# Patient Record
Sex: MALE | Race: OTHER | HISPANIC OR LATINO | Employment: FULL TIME | URBAN - METROPOLITAN AREA
[De-identification: names, ages, dates, MRNs, and addresses within clinical notes are randomized per-mention and may not be internally consistent; named-entity substitution may affect disease eponyms.]

---

## 2018-01-11 ENCOUNTER — ALLSCRIPTS OFFICE VISIT (OUTPATIENT)
Dept: OTHER | Facility: OTHER | Age: 28
End: 2018-01-11

## 2018-01-11 DIAGNOSIS — G47.9 SLEEP DISORDER: ICD-10-CM

## 2018-01-11 DIAGNOSIS — E66.01 MORBID (SEVERE) OBESITY DUE TO EXCESS CALORIES (HCC): ICD-10-CM

## 2018-01-24 VITALS
HEART RATE: 84 BPM | DIASTOLIC BLOOD PRESSURE: 96 MMHG | WEIGHT: 253.09 LBS | HEIGHT: 68 IN | TEMPERATURE: 98.5 F | SYSTOLIC BLOOD PRESSURE: 138 MMHG | BODY MASS INDEX: 38.36 KG/M2

## 2018-01-24 NOTE — PROGRESS NOTES
Assessment    1  Sleeping difficulties (780 50) (G47 9)   2  Decreased vision (369 9) (H54 7)   3  Acanthosis nigricans (701 2) (L83)   4  Morbid obesity due to excess calories (278 01) (E66 01)   5  Snoring (786 09) (R06 83)    Plan  Morbid obesity due to excess calories    · (1) COMPREHENSIVE METABOLIC PANEL; Status:Active; Requested UDO:[id number];    · (1) LIPID PANEL FASTING W DIRECT LDL REFLEX; Status:Active; Requested  DTF:[id number];   Sleeping difficulties    · (1) CBC/PLT/DIFF; Status:Active; Requested STEPHEN:[id number];    · (1) TSH WITH FT4 REFLEX; Status:Active; Requested YOU:[id number];   Snoring    · *1 - [address]  *patient has problems with loud snoring,  wakes up with feeling of choking,  Status: Hold For - Scheduling  Requested for:  [id number]  Care Summary provided  : Yes    Discussion/Summary  Discussion Summary:   Morbid obesity/snoring/sleep disturbances  [facility] for sleep apnea  He is advised about the need for weight loss which may help with breathing  Decreased vision  Is advised to follow up with an ophthalmologist given that he has not gone back to see them in several years  Acanthosis nigricans  Patient will be check for diabetes via CMP (serum glucose level)  Counseling Documentation With Imm: The patient was counseled regarding instructions for management, risk factor reductions, risks and benefits of treatment options, importance of compliance with treatment  Patient Education: Educational resources provided:   Medication SE Review and Pt Understands Tx: Possible side effects of new medications were reviewed with the patient/guardian today  The treatment plan was reviewed with the patient/guardian   The patient/guardian understands and agrees with the treatment plan      Chief Complaint  Chief Complaint Free Text Note Form: Patient presents to the clinic to establish care      History of Present Illness  HPI: As above  He reports he has not been getting care from any PCP  Denies any chronic medical conditions  However reports he has been having sleep disturbances; wakes up at night feeling like he is choking on himself, snores loudly, states he has been told that he may have sleep apnea but he is not sure wants to verify that with me today  Uses eyeglasses for decreased vision; has poor distant vision and can only see blurry when looking at letter in a book  He is not sure what his diagnosis is  Not on any medications  Currently morbidly obese with a BMI of 39   Hospital Based Practices Required Assessment:   Pain Assessment   the patient states they do not have pain  (on a scale of 0 to 10, the patient rates the pain at 0 )    Prefered Language is  ENGLISH  Primary Language is  ENGLISH  Education Completed: disease/condition, medications and treatment/procedure   Teaching Method: verbal   Person Taught: patient   Evaluation Of Learning: verbalized/demonstrated understanding      Review of Systems  Complete-Male:   Constitutional: No fever or chills, feels well, no tiredness, no recent weight gain or weight loss  Eyes: eyesight problems, but as noted in HPI, no eye pain, eyes not red and no purulent discharge from the eyes  ENT: nasal discharge and loud snoring, wakes up at night feeling like choking, denies nightmares,, but as noted in HPI, no earache, no nosebleeds and no hearing loss  Cardiovascular: No complaints of slow heart rate, no fast heart rate, no chest pain, no palpitations, no leg claudication, no lower extremity  Respiratory: shortness of breath during exertion, but as noted in HPI, no shortness of breath, no cough and no wheezing  Gastrointestinal: No complaints of abdominal pain, no constipation, no nausea or vomiting, no diarrhea or bloody stools  Musculoskeletal: No complaints of arthralgia, no myalgias, no joint swelling or stiffness, no limb pain or swelling     Integumentary: No complaints of skin rash or skin lesions, no itching, no skin wound, no dry skin  Neurological: No compliants of headache, no confusion, no convulsions, no numbness or tingling, no dizziness or fainting, no limb weakness, no difficulty walking  Psychiatric: sleep disturbances, but as noted in HPI, not suicidal, no anxiety and no depression  Hematologic/Lymphatic: No complaints of swollen glands, no swollen glands in the neck, does not bleed easily, no easy bruising  ROS Reviewed:   ROS reviewed  Active Problems    1  Decreased vision (369 9) (H54 7)   2  Sleeping difficulties (780 50) (G47 9)    Past Medical History  Active Problems And Past Medical History Reviewed: The active problems and past medical history were reviewed and updated today  Surgical History    1  History of Elective Circumcision  Surgical History Reviewed: The surgical history was reviewed and updated today  Family History  Mother    1  Family history of No significant medical problems  Father    2  Family history unknown (V49 89) (Z78 9)  Family History    3  No family history of mental disorder  Family History Reviewed: The family history was reviewed and updated today  Social History    · Current every day smoker (305 1) (F17 200)  Social History Reviewed: The social history was reviewed and updated today  The social history was reviewed and is unchanged  Current Meds   1  No Reported Medications Recorded    Allergies    1  No Known Drug Allergies    Vitals  Signs   Recorded: [id number] 03:21PM   Temperature: 98 5 F  Heart Rate: 84  Systolic: 472  Diastolic: 96  Height: 5 ft 7 5 in  Weight: 253 lb 1 38 oz  BMI Calculated: 39 05  BSA Calculated: 2 25    Physical Exam    Constitutional   General appearance: No acute distress, well appearing and well nourished  Eyes   Conjunctiva and lids: No erythema, swelling or discharge  Pupils and irises: Equal, round, reactive to light      Ears, Nose, Mouth, and Throat   Otoscopic examination: Tympanic membranes translucent with normal light reflex  Canals patent without erythema  Nasal mucosa, septum, and turbinates: Normal without edema or erythema  Oropharynx: Normal with no erythema, edema, exudate or lesions  Pulmonary   Respiratory effort: No increased work of breathing or signs of respiratory distress  Auscultation of lungs: Clear to auscultation  Cardiovascular   Auscultation of heart: Normal rate and rhythm, normal S1 and S2, no murmurs  Pedal pulses: 2+ bilaterally  Abdomen   Abdomen: Non-tender, no masses  Liver and spleen: No hepatomegaly or splenomegaly  Musculoskeletal   Gait and station: Normal     Inspection/palpation of digits and nails: Normal without clubbing or cyanosis  Inspection/palpation of joints, bones, and muscles: Normal     Range of motion: Normal     Stability: Normal     Muscle strength/tone: Normal     Skin   Skin and subcutaneous tissue: Abnormal   Dark discoloration around his neck  Acanthosis nigricans or other hyperpigmentation of melanin  Neurologic   Cranial nerves: Cranial nerves 2-12 intact   grossly intact  Psychiatric   Judgment and insight: Normal     Orientation to person, place and time: Normal     Recent and remote memory: Intact  Mood and affect: Normal        Results/Data  PHQ-2 Adult Depression Screening [id number] 03:24PM User, [employer]     Test Name Result Flag Reference   PHQ-2 Adult Depression Score 0     Over the last two weeks, how often have you been bothered by any of the following problems? Little interest or pleasure in doing things: Not at all - 0  Feeling down, depressed, or hopeless: Not at all - 0   PHQ-2 Adult Depression Screening Negative         Attending Note  Collaborating Physician Note: Collaborating Physician: I agree with the Advanced Practitioner note  Agree with Advanced Practitioner Note Except: Agree with referral for sleep study   If not sleep apnea will need further investigation into possible GI vs cardiac cause  Signatures   Electronically signed by : Missael Umanzor; Jan 11 2018  4:16PM EST                       (Author)    Electronically signed by :  FRAN Parham ; Jan 23 2018  2:56PM EST                       (Review)

## 2018-08-10 ENCOUNTER — OFFICE VISIT (OUTPATIENT)
Dept: INTERNAL MEDICINE CLINIC | Facility: CLINIC | Age: 28
End: 2018-08-10
Payer: COMMERCIAL

## 2018-08-10 VITALS
SYSTOLIC BLOOD PRESSURE: 96 MMHG | BODY MASS INDEX: 31.28 KG/M2 | HEIGHT: 69 IN | WEIGHT: 211.2 LBS | DIASTOLIC BLOOD PRESSURE: 64 MMHG | HEART RATE: 80 BPM | TEMPERATURE: 98 F

## 2018-08-10 DIAGNOSIS — R05.8 NON-PRODUCTIVE COUGH: Primary | ICD-10-CM

## 2018-08-10 PROCEDURE — 99213 OFFICE O/P EST LOW 20 MIN: CPT | Performed by: INTERNAL MEDICINE

## 2018-08-10 PROCEDURE — 3008F BODY MASS INDEX DOCD: CPT | Performed by: INTERNAL MEDICINE

## 2018-08-10 RX ORDER — PREDNISONE 10 MG/1
TABLET ORAL
Qty: 13 TABLET | Refills: 0 | Status: SHIPPED | OUTPATIENT
Start: 2018-08-10 | End: 2018-09-25

## 2018-08-10 NOTE — PROGRESS NOTES
ASSESSMENT/PLAN:  Diagnoses and all orders for this visit:    Non-productive cough  -     predniSONE 10 mg tablet; Take 30mg (three 10mg tablets) for first three days  For days 4-5, take 20mg (two 10mg tablets) daily  Plan:  Cough, nonproductive acute   Oral prednisone prescribed for five days (30mg for three days, 20mg for two days)   Patient advised he can take OTC cough suppressant for symptomatic relief and should stay hydrated  Smoking   Patient counseled on smoking cessation   Pt is not interested in quitting at this time or trying patches/gums  Follow-up:  If symptoms don't improve  Follow up for chronic medical concerns    CHIEF COMPLAINT: cough with dizziness    HISTORY OF PRESENT ILLNESS:  34 yo Pt with no significant past medical history with two weeks of coughing, nonproductive cough 3-4 times a day <1 minute  Yesterday and today patient has been experiencing lightheadedness with coughing (even after 2-3 coughs), though he has been eating and hydrating well throughout this week (approximately 3 liters)  Pt does state that last week he had 2 days of watery nonbloody, nonblack diarrhea but has not had any abnormal bowel movements in the last week  Pt does not take any medications  At last visit pt was asked to get baseline bloodwork and sleep study but has not done this yet  Pt has not had any sick contacts or recent respiratory illnesses, recently travelled to [place] this past weekend by car  Pt has been smoking pack per week for approximately 15 years  Pt would also like note for work that he was seen here today  Review of Systems   Constitutional: Negative for appetite change, chills, fatigue, fever and unexpected weight change  Respiratory: Positive for cough  Negative for choking, chest tightness, shortness of breath, wheezing and stridor  Cardiovascular: Negative for chest pain, palpitations and leg swelling  Gastrointestinal: Positive for diarrhea   Negative for abdominal pain  Genitourinary: Negative for difficulty urinating and hematuria  Musculoskeletal: Negative for myalgias  Skin: Negative for color change and pallor  Neurological: Positive for dizziness, weakness and light-headedness  Psychiatric/Behavioral: Negative for confusion  OBJECTIVE:  Vitals:    08/10/18 1124   BP: 96/64   BP Location: Left arm   Patient Position: Sitting   Cuff Size: Adult   Pulse: 80   Temp: 98 °F (36 7 °C)   TempSrc: Oral   Weight: 95 8 kg (211 lb 3 2 oz)   Height: 5' 8 5" (1 74 m)       Physical Exam   Constitutional: He is oriented to person, place, and time  He appears well-developed and well-nourished  No distress  HENT:   Head: Normocephalic and atraumatic  Mouth/Throat: Oropharynx is clear and moist  No oropharyngeal exudate  Eyes: Conjunctivae are normal  Right eye exhibits no discharge  Left eye exhibits no discharge  Cardiovascular: Normal rate, regular rhythm and normal heart sounds  Exam reveals no gallop and no friction rub  No murmur heard  Pulmonary/Chest: Effort normal and breath sounds normal  No stridor  No respiratory distress  He has no wheezes  He has no rales  Abdominal: Soft  Bowel sounds are normal  He exhibits no distension  There is no tenderness  There is no guarding  Musculoskeletal: He exhibits no edema or deformity  Neurological: He is alert and oriented to person, place, and time  Skin: Skin is warm and dry  He is not diaphoretic  Psychiatric: He has a normal mood and affect  His behavior is normal    Vitals reviewed  No current outpatient prescriptions on file  No past medical history on file  No past surgical history on file  Social History     Social History    Marital status: Single     Spouse name: N/A    Number of children: N/A    Years of education: N/A     Occupational History    Not on file       Social History Main Topics    Smoking status: Not on file    Smokeless tobacco: Not on file    Alcohol use Not on file    Drug use: Unknown    Sexual activity: Not on file     Other Topics Concern    Not on file     Social History Narrative    No narrative on file     Family History   Problem Relation Age of Onset    Diabetes Mother        Debby Steward, Yuri Galaviz 15 Internal Medicine PGY-1    [facility]  [address] [facility] [facility]  [phone]

## 2018-09-25 ENCOUNTER — APPOINTMENT (INPATIENT)
Dept: RADIOLOGY | Facility: HOSPITAL | Age: 28
DRG: 958 | End: 2018-09-25
Payer: COMMERCIAL

## 2018-09-25 ENCOUNTER — ANESTHESIA EVENT (OUTPATIENT)
Dept: PERIOP | Facility: HOSPITAL | Age: 28
End: 2018-09-25

## 2018-09-25 ENCOUNTER — APPOINTMENT (EMERGENCY)
Dept: CT IMAGING | Facility: HOSPITAL | Age: 28
End: 2018-09-25
Payer: COMMERCIAL

## 2018-09-25 ENCOUNTER — HOSPITAL ENCOUNTER (INPATIENT)
Facility: HOSPITAL | Age: 28
LOS: 6 days | Discharge: NURSING, REHAB OR LONG-TERM CARE | DRG: 958 | End: 2018-10-01
Attending: SURGERY | Admitting: SURGERY
Payer: COMMERCIAL

## 2018-09-25 ENCOUNTER — APPOINTMENT (EMERGENCY)
Dept: RADIOLOGY | Facility: HOSPITAL | Age: 28
End: 2018-09-25
Payer: COMMERCIAL

## 2018-09-25 ENCOUNTER — HOSPITAL ENCOUNTER (EMERGENCY)
Facility: HOSPITAL | Age: 28
End: 2018-09-25
Attending: EMERGENCY MEDICINE | Admitting: EMERGENCY MEDICINE
Payer: COMMERCIAL

## 2018-09-25 ENCOUNTER — APPOINTMENT (EMERGENCY)
Dept: RADIOLOGY | Facility: HOSPITAL | Age: 28
DRG: 958 | End: 2018-09-25
Payer: COMMERCIAL

## 2018-09-25 VITALS
HEART RATE: 83 BPM | HEIGHT: 68 IN | OXYGEN SATURATION: 100 % | RESPIRATION RATE: 21 BRPM | DIASTOLIC BLOOD PRESSURE: 71 MMHG | WEIGHT: 210 LBS | SYSTOLIC BLOOD PRESSURE: 146 MMHG | BODY MASS INDEX: 31.83 KG/M2 | TEMPERATURE: 98.1 F

## 2018-09-25 DIAGNOSIS — S36.029A: ICD-10-CM

## 2018-09-25 DIAGNOSIS — S32.811A MULTIPLE CLOSED FRACTURES OF PELVIS WITH UNSTABLE DISRUPTION OF PELVIC RING, INITIAL ENCOUNTER (HCC): ICD-10-CM
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
## 2018-09-25 DIAGNOSIS — S36.00XA INJURY OF SPLEEN, INITIAL ENCOUNTER: Primary | ICD-10-CM

## 2018-09-25 DIAGNOSIS — S32.10XA SACRAL FRACTURE, CLOSED (HCC): ICD-10-CM

## 2018-09-25 DIAGNOSIS — N50.1 PELVIC HEMATOMA, MALE: ICD-10-CM

## 2018-09-25 DIAGNOSIS — S32.401A CLOSED NONDISPLACED FRACTURE OF RIGHT ACETABULUM, UNSPECIFIED PORTION OF ACETABULUM, INITIAL ENCOUNTER (HCC): Primary | ICD-10-CM

## 2018-09-25 DIAGNOSIS — S32.401A RIGHT ACETABULAR FRACTURE (HCC): ICD-10-CM

## 2018-09-25 DIAGNOSIS — S32.009A LUMBAR TRANSVERSE PROCESS FRACTURE, CLOSED, INITIAL ENCOUNTER (HCC): ICD-10-CM

## 2018-09-25 PROBLEM — S36.039A SPLENIC LACERATION: Status: ACTIVE | Noted: 2018-09-25

## 2018-09-25 LAB
ANION GAP BLD CALC-SCNC: 23 MMOL/L (ref 4–13)
ANION GAP SERPL CALCULATED.3IONS-SCNC: 6 MMOL/L (ref 4–13)
BASOPHILS # BLD AUTO: 0.06 THOUSANDS/ΜL (ref 0–0.1)
BASOPHILS NFR BLD AUTO: 0 % (ref 0–1)
BUN BLD-MCNC: 11 MG/DL (ref 5–25)
BUN SERPL-MCNC: 10 MG/DL (ref 5–25)
CA-I BLD-SCNC: 1.06 MMOL/L (ref 1.12–1.32)
CALCIUM SERPL-MCNC: 8.1 MG/DL (ref 8.3–10.1)
CHLORIDE BLD-SCNC: 103 MMOL/L (ref 100–108)
CHLORIDE SERPL-SCNC: 106 MMOL/L (ref 100–108)
CO2 SERPL-SCNC: 26 MMOL/L (ref 21–32)
CREAT BLD-MCNC: 1.1 MG/DL (ref 0.6–1.3)
CREAT SERPL-MCNC: 1 MG/DL (ref 0.6–1.3)
EOSINOPHIL # BLD AUTO: 0.08 THOUSAND/ΜL (ref 0–0.61)
EOSINOPHIL NFR BLD AUTO: 0 % (ref 0–6)
ERYTHROCYTE [DISTWIDTH] IN BLOOD BY AUTOMATED COUNT: 13.2 % (ref 11.6–15.1)
GFR SERPL CREATININE-BSD FRML MDRD: 101 ML/MIN/1.73SQ M
GFR SERPL CREATININE-BSD FRML MDRD: 90 ML/MIN/1.73SQ M
GLUCOSE SERPL-MCNC: 135 MG/DL (ref 65–140)
GLUCOSE SERPL-MCNC: 194 MG/DL (ref 65–140)
HCT VFR BLD AUTO: 42.5 % (ref 36.5–49.3)
HCT VFR BLD AUTO: 42.6 % (ref 36.5–49.3)
HCT VFR BLD AUTO: 42.9 % (ref 36.5–49.3)
HCT VFR BLD CALC: 47 % (ref 36.5–49.3)
HGB BLD-MCNC: 14 G/DL (ref 12–17)
HGB BLD-MCNC: 14 G/DL (ref 12–17)
HGB BLD-MCNC: 14.1 G/DL (ref 12–17)
HGB BLDA-MCNC: 16 G/DL (ref 12–17)
IMM GRANULOCYTES # BLD AUTO: 0.2 THOUSAND/UL (ref 0–0.2)
IMM GRANULOCYTES NFR BLD AUTO: 1 % (ref 0–2)
LYMPHOCYTES # BLD AUTO: 2.33 THOUSANDS/ΜL (ref 0.6–4.47)
LYMPHOCYTES NFR BLD AUTO: 9 % (ref 14–44)
MCH RBC QN AUTO: 29.4 PG (ref 26.8–34.3)
MCHC RBC AUTO-ENTMCNC: 32.9 G/DL (ref 31.4–37.4)
MCV RBC AUTO: 89 FL (ref 82–98)
MONOCYTES # BLD AUTO: 1.79 THOUSAND/ΜL (ref 0.17–1.22)
MONOCYTES NFR BLD AUTO: 7 % (ref 4–12)
NEUTROPHILS # BLD AUTO: 20.39 THOUSANDS/ΜL (ref 1.85–7.62)
NEUTS SEG NFR BLD AUTO: 83 % (ref 43–75)
NRBC BLD AUTO-RTO: 0 /100 WBCS
PCO2 BLD: 18 MMOL/L (ref 21–32)
PLATELET # BLD AUTO: 368 THOUSANDS/UL (ref 149–390)
PMV BLD AUTO: 10.7 FL (ref 8.9–12.7)
POTASSIUM BLD-SCNC: 3.3 MMOL/L (ref 3.5–5.3)
POTASSIUM SERPL-SCNC: 4.5 MMOL/L (ref 3.5–5.3)
RBC # BLD AUTO: 4.76 MILLION/UL (ref 3.88–5.62)
SODIUM BLD-SCNC: 140 MMOL/L (ref 136–145)
SODIUM SERPL-SCNC: 138 MMOL/L (ref 136–145)
SPECIMEN SOURCE: ABNORMAL
WBC # BLD AUTO: 24.85 THOUSAND/UL (ref 4.31–10.16)

## 2018-09-25 PROCEDURE — 99285 EMERGENCY DEPT VISIT HI MDM: CPT

## 2018-09-25 PROCEDURE — 96374 THER/PROPH/DIAG INJ IV PUSH: CPT

## 2018-09-25 PROCEDURE — 72170 X-RAY EXAM OF PELVIS: CPT

## 2018-09-25 PROCEDURE — 96360 HYDRATION IV INFUSION INIT: CPT

## 2018-09-25 PROCEDURE — 73080 X-RAY EXAM OF ELBOW: CPT

## 2018-09-25 PROCEDURE — 73560 X-RAY EXAM OF KNEE 1 OR 2: CPT

## 2018-09-25 PROCEDURE — 70450 CT HEAD/BRAIN W/O DYE: CPT

## 2018-09-25 PROCEDURE — 74177 CT ABD & PELVIS W/CONTRAST: CPT

## 2018-09-25 PROCEDURE — 72125 CT NECK SPINE W/O DYE: CPT

## 2018-09-25 PROCEDURE — 36415 COLL VENOUS BLD VENIPUNCTURE: CPT | Performed by: SURGERY

## 2018-09-25 PROCEDURE — 85014 HEMATOCRIT: CPT | Performed by: SURGERY

## 2018-09-25 PROCEDURE — 85014 HEMATOCRIT: CPT

## 2018-09-25 PROCEDURE — 72190 X-RAY EXAM OF PELVIS: CPT

## 2018-09-25 PROCEDURE — 90471 IMMUNIZATION ADMIN: CPT

## 2018-09-25 PROCEDURE — 90715 TDAP VACCINE 7 YRS/> IM: CPT | Performed by: SURGERY

## 2018-09-25 PROCEDURE — 80047 BASIC METABLC PNL IONIZED CA: CPT

## 2018-09-25 PROCEDURE — 80048 BASIC METABOLIC PNL TOTAL CA: CPT | Performed by: EMERGENCY MEDICINE

## 2018-09-25 PROCEDURE — 99223 1ST HOSP IP/OBS HIGH 75: CPT | Performed by: SURGERY

## 2018-09-25 PROCEDURE — 85018 HEMOGLOBIN: CPT | Performed by: SURGERY

## 2018-09-25 PROCEDURE — 96376 TX/PRO/DX INJ SAME DRUG ADON: CPT

## 2018-09-25 PROCEDURE — 36415 COLL VENOUS BLD VENIPUNCTURE: CPT | Performed by: EMERGENCY MEDICINE

## 2018-09-25 PROCEDURE — 85025 COMPLETE CBC W/AUTO DIFF WBC: CPT | Performed by: EMERGENCY MEDICINE

## 2018-09-25 PROCEDURE — 71260 CT THORAX DX C+: CPT

## 2018-09-25 RX ORDER — OXYCODONE HYDROCHLORIDE 5 MG/1
5 TABLET ORAL EVERY 4 HOURS PRN
Status: DISCONTINUED | OUTPATIENT
Start: 2018-09-25 | End: 2018-10-01 | Stop reason: HOSPADM

## 2018-09-25 RX ORDER — CHLORHEXIDINE GLUCONATE 0.12 MG/ML
15 RINSE ORAL EVERY 12 HOURS SCHEDULED
Status: DISCONTINUED | OUTPATIENT
Start: 2018-09-25 | End: 2018-09-26

## 2018-09-25 RX ORDER — ACETAMINOPHEN 325 MG/1
650 TABLET ORAL EVERY 6 HOURS PRN
Status: DISCONTINUED | OUTPATIENT
Start: 2018-09-25 | End: 2018-09-27

## 2018-09-25 RX ORDER — MORPHINE SULFATE 4 MG/ML
4 INJECTION, SOLUTION INTRAMUSCULAR; INTRAVENOUS
Status: COMPLETED | OUTPATIENT
Start: 2018-09-25 | End: 2018-09-25

## 2018-09-25 RX ORDER — OXYCODONE HYDROCHLORIDE 10 MG/1
10 TABLET ORAL EVERY 4 HOURS PRN
Status: DISCONTINUED | OUTPATIENT
Start: 2018-09-25 | End: 2018-10-01 | Stop reason: HOSPADM

## 2018-09-25 RX ORDER — NICOTINE 21 MG/24HR
1 PATCH, TRANSDERMAL 24 HOURS TRANSDERMAL DAILY
Status: DISCONTINUED | OUTPATIENT
Start: 2018-09-26 | End: 2018-10-01 | Stop reason: HOSPADM

## 2018-09-25 RX ORDER — SODIUM CHLORIDE, SODIUM LACTATE, POTASSIUM CHLORIDE, CALCIUM CHLORIDE 600; 310; 30; 20 MG/100ML; MG/100ML; MG/100ML; MG/100ML
75 INJECTION, SOLUTION INTRAVENOUS CONTINUOUS
Status: DISCONTINUED | OUTPATIENT
Start: 2018-09-25 | End: 2018-09-28

## 2018-09-25 RX ADMIN — TETANUS TOXOID, REDUCED DIPHTHERIA TOXOID AND ACELLULAR PERTUSSIS VACCINE, ADSORBED 0.5 ML: 5; 2.5; 8; 8; 2.5 SUSPENSION INTRAMUSCULAR at 18:49

## 2018-09-25 RX ADMIN — MORPHINE SULFATE 4 MG: 4 INJECTION INTRAVENOUS at 15:37

## 2018-09-25 RX ADMIN — SODIUM CHLORIDE 1000 ML: 0.9 INJECTION, SOLUTION INTRAVENOUS at 15:04

## 2018-09-25 RX ADMIN — MORPHINE SULFATE 4 MG: 4 INJECTION INTRAVENOUS at 13:19

## 2018-09-25 RX ADMIN — IODIXANOL 100 ML: 320 INJECTION, SOLUTION INTRAVASCULAR at 15:31

## 2018-09-25 RX ADMIN — MORPHINE SULFATE 4 MG: 4 INJECTION INTRAVENOUS at 13:55

## 2018-09-25 RX ADMIN — IOHEXOL 100 ML: 350 INJECTION, SOLUTION INTRAVENOUS at 14:22

## 2018-09-25 RX ADMIN — SODIUM CHLORIDE, SODIUM LACTATE, POTASSIUM CHLORIDE, AND CALCIUM CHLORIDE 125 ML/HR: .6; .31; .03; .02 INJECTION, SOLUTION INTRAVENOUS at 19:18

## 2018-09-25 RX ADMIN — OXYCODONE HYDROCHLORIDE 10 MG: 10 TABLET ORAL at 19:25

## 2018-09-25 RX ADMIN — CHLORHEXIDINE GLUCONATE 15 ML: 1.2 RINSE ORAL at 21:56

## 2018-09-25 NOTE — ED NOTES
SO in room, updated with current plan of care and transport time        Arjun Juárez RN  09/25/18 3458
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
## 2018-09-25 NOTE — ED NOTES
Pt placed on cardiac monitor  Vital signs cycling every 30 minutes       Sachin Angela RN  09/25/18 6000
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
## 2018-09-25 NOTE — ED NOTES
Pt complaining of severe pain  MD notified   Awaiting additional orders     Wil Rosales RN  09/25/18 0892
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
## 2018-09-25 NOTE — ED NOTES
Pt and visitor aware of ordered urinalysis, pt states he is unable to void at this time, acknowledged understanding that sample is needed  Provided urinal at bedside       Stephen Serrano RN  09/25/18 8231

## 2018-09-26 ENCOUNTER — ANESTHESIA (OUTPATIENT)
Dept: PERIOP | Facility: HOSPITAL | Age: 28
End: 2018-09-26

## 2018-09-26 ENCOUNTER — APPOINTMENT (INPATIENT)
Dept: RADIOLOGY | Facility: HOSPITAL | Age: 28
DRG: 958 | End: 2018-09-26
Payer: COMMERCIAL

## 2018-09-26 PROBLEM — D62 ACUTE BLOOD LOSS ANEMIA: Status: ACTIVE | Noted: 2018-09-26

## 2018-09-26 LAB
ABO GROUP BLD: NORMAL
ANION GAP SERPL CALCULATED.3IONS-SCNC: 7 MMOL/L (ref 4–13)
APTT PPP: 35 SECONDS (ref 24–36)
BASOPHILS # BLD AUTO: 0.02 THOUSANDS/ΜL (ref 0–0.1)
BASOPHILS NFR BLD AUTO: 0 % (ref 0–1)
BLD GP AB SCN SERPL QL: NEGATIVE
BUN SERPL-MCNC: 10 MG/DL (ref 5–25)
CALCIUM SERPL-MCNC: 7.6 MG/DL (ref 8.3–10.1)
CHLORIDE SERPL-SCNC: 106 MMOL/L (ref 100–108)
CO2 SERPL-SCNC: 25 MMOL/L (ref 21–32)
CREAT SERPL-MCNC: 0.88 MG/DL (ref 0.6–1.3)
EOSINOPHIL # BLD AUTO: 0.01 THOUSAND/ΜL (ref 0–0.61)
EOSINOPHIL NFR BLD AUTO: 0 % (ref 0–6)
ERYTHROCYTE [DISTWIDTH] IN BLOOD BY AUTOMATED COUNT: 13.3 % (ref 11.6–15.1)
GFR SERPL CREATININE-BSD FRML MDRD: 116 ML/MIN/1.73SQ M
GLUCOSE SERPL-MCNC: 102 MG/DL (ref 65–140)
GLUCOSE SERPL-MCNC: 113 MG/DL (ref 65–140)
HCT VFR BLD AUTO: 34.3 % (ref 36.5–49.3)
HCT VFR BLD AUTO: 34.4 % (ref 36.5–49.3)
HCT VFR BLD AUTO: 35.5 % (ref 36.5–49.3)
HCT VFR BLD AUTO: 39.5 % (ref 36.5–49.3)
HGB BLD-MCNC: 11.1 G/DL (ref 12–17)
HGB BLD-MCNC: 11.3 G/DL (ref 12–17)
HGB BLD-MCNC: 11.6 G/DL (ref 12–17)
HGB BLD-MCNC: 13.1 G/DL (ref 12–17)
IMM GRANULOCYTES # BLD AUTO: 0.02 THOUSAND/UL (ref 0–0.2)
IMM GRANULOCYTES NFR BLD AUTO: 0 % (ref 0–2)
INR PPP: 1.4 (ref 0.86–1.17)
LACTATE SERPL-SCNC: 1.3 MMOL/L (ref 0.5–2)
LYMPHOCYTES # BLD AUTO: 1.91 THOUSANDS/ΜL (ref 0.6–4.47)
LYMPHOCYTES NFR BLD AUTO: 31 % (ref 14–44)
MCH RBC QN AUTO: 29.4 PG (ref 26.8–34.3)
MCHC RBC AUTO-ENTMCNC: 32.7 G/DL (ref 31.4–37.4)
MCV RBC AUTO: 90 FL (ref 82–98)
MONOCYTES # BLD AUTO: 0.73 THOUSAND/ΜL (ref 0.17–1.22)
MONOCYTES NFR BLD AUTO: 12 % (ref 4–12)
NEUTROPHILS # BLD AUTO: 3.55 THOUSANDS/ΜL (ref 1.85–7.62)
NEUTS SEG NFR BLD AUTO: 57 % (ref 43–75)
NRBC BLD AUTO-RTO: 0 /100 WBCS
PLATELET # BLD AUTO: 271 THOUSANDS/UL (ref 149–390)
PMV BLD AUTO: 10.5 FL (ref 8.9–12.7)
POTASSIUM SERPL-SCNC: 4 MMOL/L (ref 3.5–5.3)
PROTHROMBIN TIME: 17.3 SECONDS (ref 11.8–14.2)
RBC # BLD AUTO: 3.95 MILLION/UL (ref 3.88–5.62)
RH BLD: POSITIVE
SODIUM SERPL-SCNC: 138 MMOL/L (ref 136–145)
SPECIMEN EXPIRATION DATE: NORMAL
WBC # BLD AUTO: 6.24 THOUSAND/UL (ref 4.31–10.16)

## 2018-09-26 PROCEDURE — 82948 REAGENT STRIP/BLOOD GLUCOSE: CPT

## 2018-09-26 PROCEDURE — 86900 BLOOD TYPING SEROLOGIC ABO: CPT | Performed by: STUDENT IN AN ORGANIZED HEALTH CARE EDUCATION/TRAINING PROGRAM

## 2018-09-26 PROCEDURE — 71045 X-RAY EXAM CHEST 1 VIEW: CPT

## 2018-09-26 PROCEDURE — 99232 SBSQ HOSP IP/OBS MODERATE 35: CPT | Performed by: SURGERY

## 2018-09-26 PROCEDURE — 86901 BLOOD TYPING SEROLOGIC RH(D): CPT | Performed by: STUDENT IN AN ORGANIZED HEALTH CARE EDUCATION/TRAINING PROGRAM

## 2018-09-26 PROCEDURE — 86920 COMPATIBILITY TEST SPIN: CPT

## 2018-09-26 PROCEDURE — 80048 BASIC METABOLIC PNL TOTAL CA: CPT | Performed by: STUDENT IN AN ORGANIZED HEALTH CARE EDUCATION/TRAINING PROGRAM

## 2018-09-26 PROCEDURE — 85610 PROTHROMBIN TIME: CPT | Performed by: STUDENT IN AN ORGANIZED HEALTH CARE EDUCATION/TRAINING PROGRAM

## 2018-09-26 PROCEDURE — 85025 COMPLETE CBC W/AUTO DIFF WBC: CPT | Performed by: STUDENT IN AN ORGANIZED HEALTH CARE EDUCATION/TRAINING PROGRAM

## 2018-09-26 PROCEDURE — 85014 HEMATOCRIT: CPT | Performed by: PHYSICIAN ASSISTANT

## 2018-09-26 PROCEDURE — 83605 ASSAY OF LACTIC ACID: CPT | Performed by: STUDENT IN AN ORGANIZED HEALTH CARE EDUCATION/TRAINING PROGRAM

## 2018-09-26 PROCEDURE — 85730 THROMBOPLASTIN TIME PARTIAL: CPT | Performed by: STUDENT IN AN ORGANIZED HEALTH CARE EDUCATION/TRAINING PROGRAM

## 2018-09-26 PROCEDURE — 85018 HEMOGLOBIN: CPT | Performed by: PHYSICIAN ASSISTANT

## 2018-09-26 PROCEDURE — 85014 HEMATOCRIT: CPT | Performed by: EMERGENCY MEDICINE

## 2018-09-26 PROCEDURE — 85014 HEMATOCRIT: CPT | Performed by: SURGERY

## 2018-09-26 PROCEDURE — 85018 HEMOGLOBIN: CPT | Performed by: EMERGENCY MEDICINE

## 2018-09-26 PROCEDURE — 85018 HEMOGLOBIN: CPT | Performed by: SURGERY

## 2018-09-26 PROCEDURE — 86850 RBC ANTIBODY SCREEN: CPT | Performed by: STUDENT IN AN ORGANIZED HEALTH CARE EDUCATION/TRAINING PROGRAM

## 2018-09-26 RX ORDER — DOCUSATE SODIUM 100 MG/1
100 CAPSULE, LIQUID FILLED ORAL 2 TIMES DAILY
Status: DISCONTINUED | OUTPATIENT
Start: 2018-09-26 | End: 2018-10-01 | Stop reason: HOSPADM

## 2018-09-26 RX ORDER — SENNOSIDES 8.6 MG
2 TABLET ORAL
Status: DISCONTINUED | OUTPATIENT
Start: 2018-09-26 | End: 2018-10-01 | Stop reason: HOSPADM

## 2018-09-26 RX ADMIN — SODIUM CHLORIDE, SODIUM LACTATE, POTASSIUM CHLORIDE, AND CALCIUM CHLORIDE 125 ML/HR: .6; .31; .03; .02 INJECTION, SOLUTION INTRAVENOUS at 12:48

## 2018-09-26 RX ADMIN — OXYCODONE HYDROCHLORIDE 10 MG: 10 TABLET ORAL at 17:47

## 2018-09-26 RX ADMIN — ACETAMINOPHEN 650 MG: 325 TABLET, FILM COATED ORAL at 01:02

## 2018-09-26 RX ADMIN — HYDROMORPHONE HYDROCHLORIDE 0.5 MG: 1 INJECTION, SOLUTION INTRAMUSCULAR; INTRAVENOUS; SUBCUTANEOUS at 17:18

## 2018-09-26 RX ADMIN — OXYCODONE HYDROCHLORIDE 10 MG: 10 TABLET ORAL at 13:07

## 2018-09-26 RX ADMIN — OXYCODONE HYDROCHLORIDE 10 MG: 10 TABLET ORAL at 00:05

## 2018-09-26 RX ADMIN — DOCUSATE SODIUM 100 MG: 100 CAPSULE, LIQUID FILLED ORAL at 17:27

## 2018-09-26 RX ADMIN — OXYCODONE HYDROCHLORIDE 10 MG: 10 TABLET ORAL at 21:52

## 2018-09-26 RX ADMIN — SENNOSIDES 17.2 MG: 8.6 TABLET, FILM COATED ORAL at 21:52

## 2018-09-26 RX ADMIN — HYDROMORPHONE HYDROCHLORIDE 0.5 MG: 1 INJECTION, SOLUTION INTRAMUSCULAR; INTRAVENOUS; SUBCUTANEOUS at 11:52

## 2018-09-26 RX ADMIN — CHLORHEXIDINE GLUCONATE 15 ML: 1.2 RINSE ORAL at 09:39

## 2018-09-26 RX ADMIN — SODIUM CHLORIDE 1000 ML: 0.9 INJECTION, SOLUTION INTRAVENOUS at 04:24
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
## 2018-09-26 NOTE — SOCIAL WORK
CM met pt at bedside and tonia aware of CM role at dc  Pt denies having a LW or POA> Pt reported that his primary contact is his HARINDER Mark Poet- [phone]  Pt lives with HARINDER Jarrett in a 1st floor apt with 7-8 YASMINE  Pt was IPTA with all ADL's, drive and employed  Pt has no DME  Pt denies hx with HHC, STR, alc, drug and IP psych tx  Pt has no PCP, [employer] card given  Preferred pharmacy is [facility], [address]  Pt reported that he has car insurance through [employer] with [employer] and medical insurance through [employer]  Pt has transportation when dc  Liba, financial liaison, informed of pt's car and medical insurance  CM reviewed d/c planning process including the following: identifying help at home, patient preference for d/c planning needs, Discharge Lounge, [facility] Meds to Bed program, availability of treatment team to discuss questions or concerns patient and/or family may have regarding understanding medications and recognizing signs and symptoms once discharged  CM also encouraged patient to follow up with all recommended appointments after discharge  Patient advised of importance for patient and family to participate in managing patients medical well being
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
## 2018-09-26 NOTE — PROGRESS NOTES
T-pod loosened at bedside  Will plan to remove later this afternoon if hemoglobin is stable  Patient is not going to the OR with orthopedics until tomorrow  Type and cross for 4 units PRBCs ordered
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
## 2018-09-27 ENCOUNTER — APPOINTMENT (INPATIENT)
Dept: RADIOLOGY | Facility: HOSPITAL | Age: 28
DRG: 958 | End: 2018-09-27
Payer: COMMERCIAL

## 2018-09-27 ENCOUNTER — ANESTHESIA (INPATIENT)
Dept: PERIOP | Facility: HOSPITAL | Age: 28
DRG: 958 | End: 2018-09-27
Payer: COMMERCIAL

## 2018-09-27 ENCOUNTER — ANESTHESIA EVENT (INPATIENT)
Dept: PERIOP | Facility: HOSPITAL | Age: 28
DRG: 958 | End: 2018-09-27
Payer: COMMERCIAL

## 2018-09-27 PROBLEM — M79.672 LEFT FOOT PAIN: Status: ACTIVE | Noted: 2018-09-27

## 2018-09-27 LAB
ANION GAP SERPL CALCULATED.3IONS-SCNC: 7 MMOL/L (ref 4–13)
ATRIAL RATE: 101 BPM
BASOPHILS # BLD AUTO: 0.03 THOUSANDS/ΜL (ref 0–0.1)
BASOPHILS NFR BLD AUTO: 1 % (ref 0–1)
BUN SERPL-MCNC: 8 MG/DL (ref 5–25)
CALCIUM SERPL-MCNC: 8.1 MG/DL (ref 8.3–10.1)
CHLORIDE SERPL-SCNC: 100 MMOL/L (ref 100–108)
CO2 SERPL-SCNC: 27 MMOL/L (ref 21–32)
CREAT SERPL-MCNC: 0.79 MG/DL (ref 0.6–1.3)
EOSINOPHIL # BLD AUTO: 0.33 THOUSAND/ΜL (ref 0–0.61)
EOSINOPHIL NFR BLD AUTO: 5 % (ref 0–6)
ERYTHROCYTE [DISTWIDTH] IN BLOOD BY AUTOMATED COUNT: 13.2 % (ref 11.6–15.1)
GFR SERPL CREATININE-BSD FRML MDRD: 121 ML/MIN/1.73SQ M
GLUCOSE SERPL-MCNC: 99 MG/DL (ref 65–140)
HCT VFR BLD AUTO: 33.1 % (ref 36.5–49.3)
HCT VFR BLD AUTO: 33.3 % (ref 36.5–49.3)
HGB BLD-MCNC: 10.8 G/DL (ref 12–17)
HGB BLD-MCNC: 10.8 G/DL (ref 12–17)
IMM GRANULOCYTES # BLD AUTO: 0.01 THOUSAND/UL (ref 0–0.2)
IMM GRANULOCYTES NFR BLD AUTO: 0 % (ref 0–2)
LYMPHOCYTES # BLD AUTO: 2.13 THOUSANDS/ΜL (ref 0.6–4.47)
LYMPHOCYTES NFR BLD AUTO: 34 % (ref 14–44)
MAGNESIUM SERPL-MCNC: 1.8 MG/DL (ref 1.6–2.6)
MCH RBC QN AUTO: 29.1 PG (ref 26.8–34.3)
MCHC RBC AUTO-ENTMCNC: 32.6 G/DL (ref 31.4–37.4)
MCV RBC AUTO: 89 FL (ref 82–98)
MONOCYTES # BLD AUTO: 0.83 THOUSAND/ΜL (ref 0.17–1.22)
MONOCYTES NFR BLD AUTO: 13 % (ref 4–12)
NEUTROPHILS # BLD AUTO: 2.97 THOUSANDS/ΜL (ref 1.85–7.62)
NEUTS SEG NFR BLD AUTO: 47 % (ref 43–75)
NRBC BLD AUTO-RTO: 0 /100 WBCS
P AXIS: 51 DEGREES
PHOSPHATE SERPL-MCNC: 2.8 MG/DL (ref 2.7–4.5)
PLATELET # BLD AUTO: 222 THOUSANDS/UL (ref 149–390)
PMV BLD AUTO: 11.1 FL (ref 8.9–12.7)
POTASSIUM SERPL-SCNC: 3.9 MMOL/L (ref 3.5–5.3)
PR INTERVAL: 140 MS
QRS AXIS: 72 DEGREES
QRSD INTERVAL: 88 MS
QT INTERVAL: 316 MS
QTC INTERVAL: 409 MS
RBC # BLD AUTO: 3.71 MILLION/UL (ref 3.88–5.62)
SODIUM SERPL-SCNC: 134 MMOL/L (ref 136–145)
T WAVE AXIS: 16 DEGREES
VENTRICULAR RATE: 101 BPM
WBC # BLD AUTO: 6.3 THOUSAND/UL (ref 4.31–10.16)

## 2018-09-27 PROCEDURE — 0QS204Z REPOSITION RIGHT PELVIC BONE WITH INTERNAL FIXATION DEVICE, OPEN APPROACH: ICD-10-PCS | Performed by: ORTHOPAEDIC SURGERY

## 2018-09-27 PROCEDURE — 84100 ASSAY OF PHOSPHORUS: CPT | Performed by: PHYSICIAN ASSISTANT

## 2018-09-27 PROCEDURE — C1713 ANCHOR/SCREW BN/BN,TIS/BN: HCPCS | Performed by: ORTHOPAEDIC SURGERY

## 2018-09-27 PROCEDURE — 27216 TREAT PELVIC RING FRACTURE: CPT | Performed by: PHYSICIAN ASSISTANT

## 2018-09-27 PROCEDURE — C1769 GUIDE WIRE: HCPCS | Performed by: ORTHOPAEDIC SURGERY

## 2018-09-27 PROCEDURE — 85014 HEMATOCRIT: CPT | Performed by: PHYSICIAN ASSISTANT

## 2018-09-27 PROCEDURE — 72190 X-RAY EXAM OF PELVIS: CPT

## 2018-09-27 PROCEDURE — 27220 TREAT HIP SOCKET FRACTURE: CPT | Performed by: ORTHOPAEDIC SURGERY

## 2018-09-27 PROCEDURE — 27217 TREAT PELVIC RING FRACTURE: CPT | Performed by: PHYSICIAN ASSISTANT

## 2018-09-27 PROCEDURE — 99232 SBSQ HOSP IP/OBS MODERATE 35: CPT | Performed by: ORTHOPAEDIC SURGERY

## 2018-09-27 PROCEDURE — 83735 ASSAY OF MAGNESIUM: CPT | Performed by: PHYSICIAN ASSISTANT

## 2018-09-27 PROCEDURE — 85018 HEMOGLOBIN: CPT | Performed by: PHYSICIAN ASSISTANT

## 2018-09-27 PROCEDURE — 85025 COMPLETE CBC W/AUTO DIFF WBC: CPT | Performed by: PHYSICIAN ASSISTANT

## 2018-09-27 PROCEDURE — 27216 TREAT PELVIC RING FRACTURE: CPT | Performed by: ORTHOPAEDIC SURGERY

## 2018-09-27 PROCEDURE — 93005 ELECTROCARDIOGRAM TRACING: CPT

## 2018-09-27 PROCEDURE — 27217 TREAT PELVIC RING FRACTURE: CPT | Performed by: ORTHOPAEDIC SURGERY

## 2018-09-27 PROCEDURE — 0QS004Z REPOSITION LUMBAR VERTEBRA WITH INTERNAL FIXATION DEVICE, OPEN APPROACH: ICD-10-PCS | Performed by: ORTHOPAEDIC SURGERY

## 2018-09-27 PROCEDURE — 73630 X-RAY EXAM OF FOOT: CPT

## 2018-09-27 PROCEDURE — 80048 BASIC METABOLIC PNL TOTAL CA: CPT | Performed by: PHYSICIAN ASSISTANT

## 2018-09-27 PROCEDURE — 99232 SBSQ HOSP IP/OBS MODERATE 35: CPT | Performed by: SURGERY

## 2018-09-27 DEVICE — 3.5MM CORTEX SCREW SELF-TAPPING 60MM: Type: IMPLANTABLE DEVICE | Site: PELVIS | Status: FUNCTIONAL

## 2018-09-27 DEVICE — WASHER 13.0MM: Type: IMPLANTABLE DEVICE | Site: PELVIS | Status: FUNCTIONAL

## 2018-09-27 DEVICE — 3.5MM PUBIC SYMPHYSIS PLATE 6 HOLES INCL 2 DCP(TM) HOLES
Type: IMPLANTABLE DEVICE | Site: PELVIS | Status: FUNCTIONAL
Brand: DCP

## 2018-09-27 DEVICE — 7.3MM CANNULATED SCREW 32MM THREAD/95MM: Type: IMPLANTABLE DEVICE | Site: PELVIS | Status: FUNCTIONAL

## 2018-09-27 DEVICE — 3.5MM CORTEX SCREW SELF-TAPPING 55MM: Type: IMPLANTABLE DEVICE | Site: PELVIS | Status: FUNCTIONAL

## 2018-09-27 DEVICE — 3.5MM CORTEX SCREW SELF-TAPPING 32MM: Type: IMPLANTABLE DEVICE | Site: PELVIS | Status: FUNCTIONAL

## 2018-09-27 DEVICE — 3.5MM CORTEX SCREW SELF-TAPPING 30MM: Type: IMPLANTABLE DEVICE | Site: PELVIS | Status: FUNCTIONAL

## 2018-09-27 DEVICE — 3.5MM PUBIC SYMPHYSIS PLATE 6 HOLES: Type: IMPLANTABLE DEVICE | Site: PELVIS | Status: FUNCTIONAL

## 2018-09-27 DEVICE — 3.5MM CORTEX SCREW SELF-TAPPING 36MM: Type: IMPLANTABLE DEVICE | Site: PELVIS | Status: FUNCTIONAL

## 2018-09-27 DEVICE — 3.5MM CORTEX SCREW SELF-TAPPING 44MM: Type: IMPLANTABLE DEVICE | Site: PELVIS | Status: FUNCTIONAL

## 2018-09-27 RX ORDER — HYDROMORPHONE HCL 110MG/55ML
0.5 PATIENT CONTROLLED ANALGESIA SYRINGE INTRAVENOUS
Status: DISCONTINUED | OUTPATIENT
Start: 2018-09-27 | End: 2018-09-27 | Stop reason: HOSPADM

## 2018-09-27 RX ORDER — MAGNESIUM HYDROXIDE 1200 MG/15ML
LIQUID ORAL AS NEEDED
Status: DISCONTINUED | OUTPATIENT
Start: 2018-09-27 | End: 2018-09-27 | Stop reason: HOSPADM

## 2018-09-27 RX ORDER — ONDANSETRON 2 MG/ML
4 INJECTION INTRAMUSCULAR; INTRAVENOUS ONCE AS NEEDED
Status: DISCONTINUED | OUTPATIENT
Start: 2018-09-27 | End: 2018-09-27 | Stop reason: HOSPADM

## 2018-09-27 RX ORDER — MEPERIDINE HYDROCHLORIDE 25 MG/ML
12.5 INJECTION INTRAMUSCULAR; INTRAVENOUS; SUBCUTANEOUS AS NEEDED
Status: DISCONTINUED | OUTPATIENT
Start: 2018-09-27 | End: 2018-09-27 | Stop reason: HOSPADM

## 2018-09-27 RX ORDER — ALBUTEROL SULFATE 2.5 MG/3ML
2.5 SOLUTION RESPIRATORY (INHALATION) ONCE AS NEEDED
Status: DISCONTINUED | OUTPATIENT
Start: 2018-09-27 | End: 2018-09-27 | Stop reason: HOSPADM

## 2018-09-27 RX ORDER — OXYCODONE HYDROCHLORIDE 5 MG/1
5 TABLET ORAL EVERY 6 HOURS PRN
Qty: 30 TABLET | Refills: 0 | Status: SHIPPED | OUTPATIENT
Start: 2018-09-27 | End: 2018-10-07

## 2018-09-27 RX ORDER — MIDAZOLAM HYDROCHLORIDE 1 MG/ML
INJECTION INTRAMUSCULAR; INTRAVENOUS AS NEEDED
Status: DISCONTINUED | OUTPATIENT
Start: 2018-09-27 | End: 2018-09-27 | Stop reason: SURG

## 2018-09-27 RX ORDER — ROCURONIUM BROMIDE 10 MG/ML
INJECTION, SOLUTION INTRAVENOUS AS NEEDED
Status: DISCONTINUED | OUTPATIENT
Start: 2018-09-27 | End: 2018-09-27 | Stop reason: SURG

## 2018-09-27 RX ORDER — GLYCOPYRROLATE 0.2 MG/ML
INJECTION INTRAMUSCULAR; INTRAVENOUS AS NEEDED
Status: DISCONTINUED | OUTPATIENT
Start: 2018-09-27 | End: 2018-09-27 | Stop reason: SURG

## 2018-09-27 RX ORDER — HYDROMORPHONE HCL/PF 1 MG/ML
0.5 SYRINGE (ML) INJECTION
Status: DISCONTINUED | OUTPATIENT
Start: 2018-09-27 | End: 2018-10-01 | Stop reason: HOSPADM

## 2018-09-27 RX ORDER — METHOCARBAMOL 500 MG/1
500 TABLET, FILM COATED ORAL EVERY 6 HOURS PRN
Status: DISCONTINUED | OUTPATIENT
Start: 2018-09-27 | End: 2018-10-01 | Stop reason: HOSPADM

## 2018-09-27 RX ORDER — ACETAMINOPHEN 325 MG/1
975 TABLET ORAL EVERY 8 HOURS SCHEDULED
Status: DISCONTINUED | OUTPATIENT
Start: 2018-09-27 | End: 2018-10-01 | Stop reason: HOSPADM

## 2018-09-27 RX ORDER — ONDANSETRON 2 MG/ML
INJECTION INTRAMUSCULAR; INTRAVENOUS AS NEEDED
Status: DISCONTINUED | OUTPATIENT
Start: 2018-09-27 | End: 2018-09-27 | Stop reason: SURG

## 2018-09-27 RX ORDER — FENTANYL CITRATE 50 UG/ML
INJECTION, SOLUTION INTRAMUSCULAR; INTRAVENOUS AS NEEDED
Status: DISCONTINUED | OUTPATIENT
Start: 2018-09-27 | End: 2018-09-27 | Stop reason: SURG

## 2018-09-27 RX ORDER — HYDROMORPHONE HYDROCHLORIDE 2 MG/ML
INJECTION, SOLUTION INTRAMUSCULAR; INTRAVENOUS; SUBCUTANEOUS AS NEEDED
Status: DISCONTINUED | OUTPATIENT
Start: 2018-09-27 | End: 2018-09-27 | Stop reason: SURG

## 2018-09-27 RX ORDER — PROPOFOL 10 MG/ML
INJECTION, EMULSION INTRAVENOUS AS NEEDED
Status: DISCONTINUED | OUTPATIENT
Start: 2018-09-27 | End: 2018-09-27 | Stop reason: SURG

## 2018-09-27 RX ORDER — FENTANYL CITRATE/PF 50 MCG/ML
25 SYRINGE (ML) INJECTION
Status: DISCONTINUED | OUTPATIENT
Start: 2018-09-27 | End: 2018-09-27 | Stop reason: HOSPADM

## 2018-09-27 RX ADMIN — NEOSTIGMINE METHYLSULFATE 3 MG: 1 INJECTION, SOLUTION INTRAMUSCULAR; INTRAVENOUS; SUBCUTANEOUS at 16:02

## 2018-09-27 RX ADMIN — SODIUM CHLORIDE, SODIUM LACTATE, POTASSIUM CHLORIDE, AND CALCIUM CHLORIDE: .6; .31; .03; .02 INJECTION, SOLUTION INTRAVENOUS at 14:54

## 2018-09-27 RX ADMIN — OXYCODONE HYDROCHLORIDE 10 MG: 10 TABLET ORAL at 07:31

## 2018-09-27 RX ADMIN — SODIUM CHLORIDE, SODIUM LACTATE, POTASSIUM CHLORIDE, AND CALCIUM CHLORIDE 75 ML/HR: .6; .31; .03; .02 INJECTION, SOLUTION INTRAVENOUS at 00:42

## 2018-09-27 RX ADMIN — ONDANSETRON 4 MG: 2 INJECTION INTRAMUSCULAR; INTRAVENOUS at 16:02

## 2018-09-27 RX ADMIN — MIDAZOLAM 2 MG: 1 INJECTION INTRAMUSCULAR; INTRAVENOUS at 14:54

## 2018-09-27 RX ADMIN — CEFAZOLIN SODIUM 2000 MG: 2 SOLUTION INTRAVENOUS at 22:46

## 2018-09-27 RX ADMIN — LIDOCAINE HYDROCHLORIDE 50 MG: 20 INJECTION, SOLUTION INTRAVENOUS at 14:58

## 2018-09-27 RX ADMIN — PROPOFOL 200 MG: 10 INJECTION, EMULSION INTRAVENOUS at 14:58

## 2018-09-27 RX ADMIN — HYDROMORPHONE HYDROCHLORIDE 1 MG: 2 INJECTION, SOLUTION INTRAMUSCULAR; INTRAVENOUS; SUBCUTANEOUS at 15:03

## 2018-09-27 RX ADMIN — CEFAZOLIN SODIUM 2000 MG: 2 SOLUTION INTRAVENOUS at 15:12

## 2018-09-27 RX ADMIN — SENNOSIDES 17.2 MG: 8.6 TABLET, FILM COATED ORAL at 21:19

## 2018-09-27 RX ADMIN — DEXAMETHASONE SODIUM PHOSPHATE 4 MG: 10 INJECTION INTRAMUSCULAR; INTRAVENOUS at 15:12

## 2018-09-27 RX ADMIN — OXYCODONE HYDROCHLORIDE 10 MG: 10 TABLET ORAL at 21:19

## 2018-09-27 RX ADMIN — GLYCOPYRROLATE 0.4 MG: 0.2 INJECTION, SOLUTION INTRAMUSCULAR; INTRAVENOUS at 16:02

## 2018-09-27 RX ADMIN — OXYCODONE HYDROCHLORIDE 10 MG: 10 TABLET ORAL at 17:31

## 2018-09-27 RX ADMIN — FENTANYL CITRATE 100 MCG: 50 INJECTION, SOLUTION INTRAMUSCULAR; INTRAVENOUS at 14:58

## 2018-09-27 RX ADMIN — ACETAMINOPHEN 975 MG: 325 TABLET ORAL at 21:19

## 2018-09-27 RX ADMIN — ENOXAPARIN SODIUM 40 MG: 40 INJECTION SUBCUTANEOUS at 22:06

## 2018-09-27 RX ADMIN — DOCUSATE SODIUM 100 MG: 100 CAPSULE, LIQUID FILLED ORAL at 08:48

## 2018-09-27 RX ADMIN — ROCURONIUM BROMIDE 50 MG: 10 INJECTION INTRAVENOUS at 14:58

## 2018-09-27 RX ADMIN — DOCUSATE SODIUM 100 MG: 100 CAPSULE, LIQUID FILLED ORAL at 17:29

## 2018-09-27 NOTE — ASSESSMENT & PLAN NOTE
-tenderness palpation to the dorsal aspect of the left foot  -plain film ordered  -neurovascularly intact

## 2018-09-27 NOTE — ASSESSMENT & PLAN NOTE
-hemoglobins are stable  -vitals are stable  -check a m   Labs  -continue to monitor  -abdominal exam is stable

## 2018-09-27 NOTE — ANESTHESIA POSTPROCEDURE EVALUATION
Post-Op Assessment Note      CV Status:  Stable    Mental Status:  Alert and awake    Hydration Status:  Euvolemic    PONV Controlled:  Controlled    Airway Patency:  Patent    Post Op Vitals Reviewed: Yes          Staff: CRNA           BP (P) 168/86 (09/27/18 1625)    Temp 98 3 °F (36 8 °C) (09/27/18 1625)    Pulse (P) 93 (09/27/18 1625)   Resp (P) 18 (09/27/18 1625)    SpO2   99%

## 2018-09-27 NOTE — PROGRESS NOTES
Progress Note - Orthopedics   Stephanie Mendez 33 y o  male MRN: [id number]  Unit/Bed#: [facility] 919-01      Subjective:    33 y o male right pelvic and acetabular fracture  No acute events, pain in the right hip pelvic region is worse with pain relieved with rest   No events overnight Denies fevers chills, CP, SOB  Denies numbness tingling in his lower extremities    Labs:    0  Lab Value Date/Time   HCT 33 1 (L) 09/27/2018 0504   HCT 34 3 (L) 09/26/2018 2032   HCT 34 4 (L) 09/26/2018 1217   HGB 10 8 (L) 09/27/2018 0504   HGB 11 1 (L) 09/26/2018 2032   HGB 11 3 (L) 09/26/2018 1217   INR 1 40 (H) 09/26/2018 0537   WBC 6 30 09/27/2018 0504   WBC 6 24 09/26/2018 0537   WBC 24 85 (H) 09/25/2018 1507       Meds:    Current Facility-Administered Medications:     acetaminophen (TYLENOL) tablet 650 mg, 650 mg, Oral, Q6H PRN, Cornelius Pradhan MD, 650 mg at 09/26/18 0102    docusate sodium (COLACE) capsule 100 mg, 100 mg, Oral, BID, Sandi Landrum PA-C, 100 mg at 09/26/18 1727    HYDROmorphone (DILAUDID) injection 0 5 mg, 0 5 mg, Intravenous, Q3H PRN, Todd Uriarte MD, 0 5 mg at 09/26/18 1718    lactated ringers infusion, 75 mL/hr, Intravenous, Continuous, Sandi Landrum PA-C, Last Rate: 75 mL/hr at 09/27/18 0042, 75 mL/hr at 09/27/18 0042    nicotine (NICODERM CQ) 14 mg/24hr TD 24 hr patch 1 patch, 1 patch, Transdermal, Daily, Cornelius Pradhan MD    oxyCODONE (ROXICODONE) immediate release tablet 10 mg, 10 mg, Oral, Q4H PRN, Cornelius Pradhan MD, 10 mg at 09/26/18 2152    oxyCODONE (ROXICODONE) IR tablet 5 mg, 5 mg, Oral, Q4H PRN, Cornelius Pradhan MD    [facility]) tablet 17 2 mg, 2 tablet, Oral, HS, Sandi Landrum PA-C, 17 2 mg at 09/26/18 2152    Blood Culture:   No results found for: BLOODCX    Wound Culture:   No results found for: WOUNDCULT    Ins and Outs:  I/O last 24 hours: In: 3428 3 [P O :240;  I V :3188 3]  Out: 1670 [Urine:1670]          Physical:  Vitals:    09/27/18 0318   BP:    Pulse: Resp:    Temp: 100 4 °F (38 °C)   SpO2:      Musculoskeletal: bilateral Lower Extremity    · Pain with range of motion bilateral hips  · Gross sensation intact bilateral lower extremities calf nontender to palpation  · SILT s/s/sp/dp/t  +fhl/ehl, +ankle dorsi/plantar flexion    +PT pulse    _*_*_*_*_*_*_*_*_*_*_*_*_*_*_*_*_*_*_*_*_*_*_*_*_*_*_*_*_*_*_*_*_*_*_*_*_*_*_*_*_*    Assessment:    28 y o male right APC II pelvic and transverse acetabular fracture    Plan:  · NWB RLE  · Patient is scheduled to go to OR today for operative fixation  · Pain control  · DVT ppx-currently held until postop  · Dispo: Ortho will follow    Joe Nguyen PA-C

## 2018-09-27 NOTE — PLAN OF CARE
DISCHARGE PLANNING     Discharge to home or other facility with appropriate resources Progressing        DISCHARGE PLANNING - CARE MANAGEMENT     Discharge to post-acute care or home with appropriate resources Progressing        INFECTION - ADULT     Absence or prevention of progression during hospitalization Progressing     Absence of fever/infection during neutropenic period Progressing        Knowledge Deficit     Patient/family/caregiver demonstrates understanding of disease process, treatment plan, medications, and discharge instructions Progressing        MUSCULOSKELETAL - ADULT     Maintain or return mobility to safest level of function Progressing     Maintain proper alignment of affected body part Progressing        Nutrition/Hydration-ADULT     Nutrient/Hydration intake appropriate for improving, restoring or maintaining nutritional needs Progressing        PAIN - ADULT     Verbalizes/displays adequate comfort level or baseline comfort level Progressing        Potential for Falls     Patient will remain free of falls Progressing        Prexisting or High Potential for Compromised Skin Integrity     Skin integrity is maintained or improved Progressing        SAFETY ADULT     Patient will remain free of falls Progressing     Maintain or return to baseline ADL function Progressing     Maintain or return mobility status to optimal level Progressing        SKIN/TISSUE INTEGRITY - ADULT     Skin integrity remains intact Progressing     Incision(s), wounds(s) or drain site(s) healing without S/S of infection Progressing     Oral mucous membranes remain intact Progressing

## 2018-09-27 NOTE — ASSESSMENT & PLAN NOTE
-orthopedics consulted, appreciate input  -to the OR today with Orthopedics for operative repair  -a m   Labs are stable  -NPO with fluids  -postoperative check  -neurovascular checks

## 2018-09-27 NOTE — PROGRESS NOTES
Patient is a 30-year-old male who was transferred from [facility] for higher level of care with the Specter of pelvic injury  He describes pain in the pelvis, and pain in the right hip  He describes no numbness and no tingling to the digits of his right or left lower extremity  Examination finds modest scrotal swelling, and modest ecchymotic standing  He is tender over the symphysis pubis where there is a palpable gap  His right hip has tenderness palpation  He has no obvious limb length inequality  There is no deficit and the myotomes L4 through S1 on either lower extremity  I have personally reviewed x-rays and a CT scan and my interpretation is as follows:  Symphyseal widening, and right sided sacroiliac widening identified on CT scan  In addition there is a completely nondisplaced associated transverse plus posterior wall right acetabular fracture  Assessment/plan:  30-year-old male with acute traumatic injury to the pelvis which is anterior-posterior compression type 2 injury  Operative repair is indicated  I discussed the role of front back peddling stabilization with the patient  His right acetabular fracture can safely and successfully treated nonsurgically  After review of the risks and benefits, consent was established  Plan:   This patient can undergo operative repair of his unstable pelvic injury today

## 2018-09-27 NOTE — PROGRESS NOTES
Progress Note - Hope Wick 1990, 33 y o  male MRN: [id number]    Unit/Bed#: [facility] 919-01 Encounter: [id number]    Primary Care Provider: TOI Sanabria   Date and time admitted to hospital: 9/25/2018  4:20 PM        Left foot pain   Assessment & Plan    -tenderness palpation to the dorsal aspect of the left foot  -plain film ordered  -neurovascularly intact     Acute blood loss anemia   Assessment & Plan    -continue to monitor  -a m  Labs  -vitals stable     Splenic laceration   Assessment & Plan    -hemoglobins are stable  -vitals are stable  -check a m  Labs  -continue to monitor  -abdominal exam is stable     Right acetabular fracture [facility])   Assessment & Plan    -orthopedics consulted, appreciate input  -to the OR today with Orthopedics for operative repair  -a m  Labs are stable  -NPO with fluids  -postoperative check  -neurovascular checks     * Multiple closed fractures of pelvis with unstable disruption of pelvic [place] (HCC)   Assessment & Plan    -orthopedics consulted, appreciate input  -the OR today with Orthopedics  -neurovascular checks       DVT prophylaxis: SCDs and holding chemical prophylaxis secondary to OR today; restart in post-op  PT and OT:  Eval and treat    Disposition: OR today with Orthopedics  Check AM labs  Monitor urine output  His urine output becomes grossly bloody consider retrograde urethrogram   At this time continue Lombardo until after OR and likely discontinue  Subjective/Objective   Chief Complaint:  No new complaints    Subjective: Patient offers no new complaints today on presentation  Reports he is ready to go to the OR today with Orthopedics  Reports no new numbness or tingling  Denies any fevers, chills, sweats  Denies any chest pain or shortness of breath  Objective:     Meds/Allergies   No prescriptions prior to admission  Vitals: Blood pressure 136/65, pulse 95, temperature 100 4 °F (38 °C), temperature source Oral, resp   rate 16, height 5' 8" (1 727 m), weight 95 3 kg (210 lb), SpO2 98 %  Body mass index is 31 93 kg/m²  SpO2: SpO2: 98 %, SpO2 Device: O2 Device: None (Room air)    ABG: No results found for: PHART, MNZ9FEV, PO2ART, NDR1PLG, C1GYTKHP, BEART, SOURCE      Intake/Output Summary (Last 24 hours) at 09/27/18 0656  Last data filed at 09/27/18 7783   Gross per 24 hour   Intake          2340 83 ml   Output             2900 ml   Net          -559 17 ml       Invasive Devices     Peripheral Intravenous Line            Peripheral IV 09/25/18 Left Antecubital 1 day    Peripheral IV 09/25/18 Right Antecubital 1 day          Drain            Urethral Catheter Temperature probe 16 Fr  1 day                Nutrition/GI Proph/Bowel Reg:  Continue NPO    Physical Exam:   GENERAL APPEARANCE:  No acute distress, well-appearing  HEENT:  Normocephalic, PERRLA  CV:  Regular rate and rhythm  LUNGS:  CTA bilaterally  ABD:  Bowel sounds x4, nontender, nondistended, no rebound or guarding  EXT:  +2 pulses on extremities, neurovascularly intact, slight swelling over the left knee that appears stable; tenderness to palpation over left foot  NEURO:  Cranial nerves 2-12 intact, no focal deficits  SKIN:  Warm, dry, intact    Lab Results:   Results: I have personally reviewed pertinent reports   , BMP/CMP:   Lab Results   Component Value Date     (L) 09/27/2018    K 3 9 09/27/2018     09/27/2018    CO2 27 09/27/2018    BUN 8 09/27/2018    CREATININE 0 79 09/27/2018    CALCIUM 8 1 (L) 09/27/2018    EGFR 121 09/27/2018    and CBC:   Lab Results   Component Value Date    WBC 6 30 09/27/2018    HGB 10 8 (L) 09/27/2018    HCT 33 1 (L) 09/27/2018    MCV 89 09/27/2018     09/27/2018    MCH 29 1 09/27/2018    MCHC 32 6 09/27/2018    RDW 13 2 09/27/2018    MPV 11 1 09/27/2018    NRBC 0 09/27/2018     Imaging/EKG Studies: Results: I have personally reviewed pertinent reports      Other Studies:  No other studies  VTE Prophylaxis:  SCDs and chemical prophylaxis on hold secondary to OR    Nurse provider rounds completed, plan of care discussed

## 2018-09-27 NOTE — ANESTHESIA PREPROCEDURE EVALUATION
Review of Systems/Medical History  Patient summary reviewed        Cardiovascular  Negative cardio ROS Exercise tolerance (METS): >4,     Pulmonary  Negative pulmonary ROS        GI/Hepatic  Negative GI/hepatic ROS          Negative  ROS        Endo/Other  Negative endo/other ROS      GYN  Negative gynecology ROS          Hematology  Negative hematology ROS      Musculoskeletal  Negative musculoskeletal ROS        Neurology  Negative neurology ROS      Psychology   Negative psychology ROS              Physical Exam    Airway    Mallampati score: II  TM Distance: >3 FB  Neck ROM: full     Dental   No notable dental hx     Cardiovascular  Comment: Negative ROS,     Pulmonary      Other Findings        Anesthesia Plan  ASA Score- 3     Anesthesia Type- general with ASA Monitors  Additional Monitors:   Airway Plan: ETT  Comment: Patient seen and examined  History reviewed  Patient to be done under general anesthesia with ETT and routine monitors  Risks discussed with the patient  Consent obtained        Plan Factors-    Induction- intravenous  Postoperative Plan-     Informed Consent- Anesthetic plan and risks discussed with patient  I personally reviewed this patient with the CRNA  Discussed and agreed on the Anesthesia Plan with the CRNA  Ml Dsouza

## 2018-09-27 NOTE — DISCHARGE INSTRUCTIONS
Discharge Instructions - Orthopedics  Merlene Smoke 33 y o  male MRN: [id number]  Unit/Bed#: PACU 14    Weight Bearing Status:                                           Stand to transfer only left lower extremity  Non-weight bearing right lower extremity    DVT prophylaxis:  Complete course of Lovenox as directed    Pain:  Continue analgesics as directed    Dressing Instructions:   Keep dressing clean, dry and intact until follow up appointment  Do not shower until     PT/OT:  Continue PT/OT on outpatient basis as directed    Appt Instructions: If you do not have your appointment, please call the clinic at [phone] to f/u with Dr Arturo Lala in the office in one week  Contact the office sooner if you experience any increased numbness/tingling in the extremities

## 2018-09-27 NOTE — OP NOTE
OPERATIVE REPORT  PATIENT NAME: Merlene Hennessy    :  1990  MRN: [id number]  Pt Location: [facility] OR ROOM 04    SURGERY DATE: 2018    Surgeon(s) and Role:     * Jerman Coronel MD - Primary     * Dario Leigh PA-C - WedWu, Martín Giles - Assisting    Preop Diagnosis:  Multiple closed fractures of pelvis with unstable disruption of pelvic ring, initial encounter ([place] [id number] ) Paula Reyes    Post-Op Diagnosis Codes:     * Multiple closed fractures of pelvis with unstable disruption of pelvic ring, initial encounter ([place] [address] ) [S32 811A]    Procedure(s) (LRB):  OPEN REDUCTION W/ INTERNAL FIXATION (ORIF) PELVIS ANTERIOR/POSTERIOR APPROACH, ORIF PUBIC SYMPHYSIS (Bilateral)  Open reduction internal fixation anterior pelvic ring  Closed reduction internal fixation right posterior pelvic ring injury    Specimen(s):  * No specimens in log *    Estimated Blood Loss:   100 mL    Drains:  Urethral Catheter Temperature probe 16 Fr  (Active)   Amt returned on insertion(mL) 600 mL 2018  6:14 PM   Reasons to continue Urinary Catheter  Accurate I&O assessment in critically ill patients (48 hr  max) 2018 11:38 AM   Site Assessment Clean;Skin intact 2018  8:00 AM   Collection Container Standard drainage bag 2018  8:00 AM   Securement Method Securing device (Describe) 2018  8:00 AM   Output (mL) 1200 mL 2018  6:20 AM   Number of days: 2       Anesthesia Type:   General    Operative Indications:  Multiple closed fractures of pelvis with unstable disruption of pelvic ring, initial encounter ([place] [address] ) [S32 811A]      Operative Findings:  Six hole symphyseal plate anteriorly  A single 7 3 mm partly threaded cannulated screw posteriorly on the right side    Complications:   None    Procedure and Technique: Following induction of adequate level of general anesthesia, the fluoroscope was brought in  Proper AP, inlet, outlet, and true lateral x-rays were obtainable    The pelvic and lower abdominal area then prepped draped sterilely  Antibiotics administered  A Pfannenstiel approach was created order gain access to the anterior pelvic ring  A midline rectus split was chosen date of the pelvis  Utilizing a 2 screw technique and the [place] pelvic reduction clamps, and open reduction was achieved at the pubic symphysis  In incomplete reduction of the right SI joint was obtained, but there was no gapping of the right transverse acetabular fracture  A 6 hole symphyseal plate was applied to the pelvic brain standard fashion  A true lateral x-ray was then obtained  In proper starting point for an SI screw was identified  A guide pin from the 7 3 mm cannulated screw set was then placed from the posterior ilium across the SI joint into the body of S1  This was placed utilizing proper AP inlet and outlet films  The appropriate sized cannulated screw was placed over a washer and generated significant compression at the SI joint  Final fluoroscopic films document a well-aligned pelvic ring, good hardware position, and no gapping of the right acetabular fracture  Satisfied with the extent of surgery, the symphysis incision was closed  The rectus split was closed number Vicryl suture  The subcu tissue closed 2 Vicryl suture  The skin was closed staples  The SI screw insertion site was closed with a 2 O Vicryl for the subcutaneous tissues, and staples for the skin  Sterile dressings were applied  This patient was then awakened from general anesthesia, taken recovery room in stable condition with plans to include standing bed-to-chair transfer on the left lower extremity  He will require Lovenox for DVT prophylaxis  Please note that no qualified orthopedic resident was available to assist, therefore the assistance of 2 physician assistance was critical in the safe execution of this patient's surgery   Started the patient position, patient prep drape, implant resistance, wound closure, dressing application, patient transfer, all of these were performed under my direct supervision   I was present for the entire procedure and A qualified resident physician was not available    Patient Disposition:  PACU     SIGNATURE: Bean Cortés MD  DATE: September 27, 2018  TIME: 4:17 PM

## 2018-09-28 LAB
ABO GROUP BLD BPU: NORMAL
ANION GAP SERPL CALCULATED.3IONS-SCNC: 8 MMOL/L (ref 4–13)
BASOPHILS # BLD AUTO: 0.01 THOUSANDS/ΜL (ref 0–0.1)
BASOPHILS NFR BLD AUTO: 0 % (ref 0–1)
BPU ID: NORMAL
BUN SERPL-MCNC: 9 MG/DL (ref 5–25)
CALCIUM SERPL-MCNC: 8.6 MG/DL (ref 8.3–10.1)
CHLORIDE SERPL-SCNC: 96 MMOL/L (ref 100–108)
CO2 SERPL-SCNC: 28 MMOL/L (ref 21–32)
CREAT SERPL-MCNC: 0.86 MG/DL (ref 0.6–1.3)
CROSSMATCH: NORMAL
EOSINOPHIL # BLD AUTO: 0.05 THOUSAND/ΜL (ref 0–0.61)
EOSINOPHIL NFR BLD AUTO: 1 % (ref 0–6)
ERYTHROCYTE [DISTWIDTH] IN BLOOD BY AUTOMATED COUNT: 13 % (ref 11.6–15.1)
GFR SERPL CREATININE-BSD FRML MDRD: 117 ML/MIN/1.73SQ M
GLUCOSE SERPL-MCNC: 117 MG/DL (ref 65–140)
HCT VFR BLD AUTO: 32.4 % (ref 36.5–49.3)
HGB BLD-MCNC: 10.6 G/DL (ref 12–17)
IMM GRANULOCYTES # BLD AUTO: 0.02 THOUSAND/UL (ref 0–0.2)
IMM GRANULOCYTES NFR BLD AUTO: 0 % (ref 0–2)
LYMPHOCYTES # BLD AUTO: 1.39 THOUSANDS/ΜL (ref 0.6–4.47)
LYMPHOCYTES NFR BLD AUTO: 17 % (ref 14–44)
MCH RBC QN AUTO: 29.2 PG (ref 26.8–34.3)
MCHC RBC AUTO-ENTMCNC: 32.7 G/DL (ref 31.4–37.4)
MCV RBC AUTO: 89 FL (ref 82–98)
MONOCYTES # BLD AUTO: 0.88 THOUSAND/ΜL (ref 0.17–1.22)
MONOCYTES NFR BLD AUTO: 11 % (ref 4–12)
NEUTROPHILS # BLD AUTO: 5.81 THOUSANDS/ΜL (ref 1.85–7.62)
NEUTS SEG NFR BLD AUTO: 71 % (ref 43–75)
NRBC BLD AUTO-RTO: 0 /100 WBCS
PLATELET # BLD AUTO: 238 THOUSANDS/UL (ref 149–390)
PMV BLD AUTO: 10.9 FL (ref 8.9–12.7)
POTASSIUM SERPL-SCNC: 4.2 MMOL/L (ref 3.5–5.3)
RBC # BLD AUTO: 3.63 MILLION/UL (ref 3.88–5.62)
SODIUM SERPL-SCNC: 132 MMOL/L (ref 136–145)
UNIT DISPENSE STATUS: NORMAL
UNIT PRODUCT CODE: NORMAL
UNIT RH: NORMAL
WBC # BLD AUTO: 8.16 THOUSAND/UL (ref 4.31–10.16)

## 2018-09-28 PROCEDURE — G8987 SELF CARE CURRENT STATUS: HCPCS

## 2018-09-28 PROCEDURE — 85025 COMPLETE CBC W/AUTO DIFF WBC: CPT | Performed by: PHYSICIAN ASSISTANT

## 2018-09-28 PROCEDURE — 97163 PT EVAL HIGH COMPLEX 45 MIN: CPT

## 2018-09-28 PROCEDURE — G8978 MOBILITY CURRENT STATUS: HCPCS

## 2018-09-28 PROCEDURE — G8988 SELF CARE GOAL STATUS: HCPCS

## 2018-09-28 PROCEDURE — 99232 SBSQ HOSP IP/OBS MODERATE 35: CPT | Performed by: SURGERY

## 2018-09-28 PROCEDURE — 80048 BASIC METABOLIC PNL TOTAL CA: CPT | Performed by: PHYSICIAN ASSISTANT

## 2018-09-28 PROCEDURE — 99024 POSTOP FOLLOW-UP VISIT: CPT | Performed by: ORTHOPAEDIC SURGERY

## 2018-09-28 PROCEDURE — 97166 OT EVAL MOD COMPLEX 45 MIN: CPT

## 2018-09-28 PROCEDURE — G8979 MOBILITY GOAL STATUS: HCPCS

## 2018-09-28 RX ADMIN — ACETAMINOPHEN 975 MG: 325 TABLET ORAL at 14:45

## 2018-09-28 RX ADMIN — DOCUSATE SODIUM 100 MG: 100 CAPSULE, LIQUID FILLED ORAL at 10:07

## 2018-09-28 RX ADMIN — OXYCODONE HYDROCHLORIDE 10 MG: 10 TABLET ORAL at 18:20

## 2018-09-28 RX ADMIN — OXYCODONE HYDROCHLORIDE 10 MG: 10 TABLET ORAL at 10:06

## 2018-09-28 RX ADMIN — ACETAMINOPHEN 975 MG: 325 TABLET ORAL at 05:36

## 2018-09-28 RX ADMIN — CEFAZOLIN SODIUM 2000 MG: 2 SOLUTION INTRAVENOUS at 06:45

## 2018-09-28 RX ADMIN — ACETAMINOPHEN 975 MG: 325 TABLET ORAL at 21:14

## 2018-09-28 RX ADMIN — OXYCODONE HYDROCHLORIDE 10 MG: 10 TABLET ORAL at 05:36

## 2018-09-28 RX ADMIN — DOCUSATE SODIUM 100 MG: 100 CAPSULE, LIQUID FILLED ORAL at 18:20

## 2018-09-28 RX ADMIN — ENOXAPARIN SODIUM 40 MG: 40 INJECTION SUBCUTANEOUS at 20:14

## 2018-09-28 RX ADMIN — SENNOSIDES 17.2 MG: 8.6 TABLET, FILM COATED ORAL at 21:14

## 2018-09-28 NOTE — PLAN OF CARE
Problem: OCCUPATIONAL THERAPY ADULT  Goal: Performs self-care activities at highest level of function for planned discharge setting  See evaluation for individualized goals  Treatment Interventions: ADL retraining, Functional transfer training, Endurance training, Patient/family training, Equipment evaluation/education, Compensatory technique education, Activityengagement          See flowsheet documentation for full assessment, interventions and recommendations  Limitation: Decreased ADL status, Decreased endurance, Decreased self-care trans, Decreased high-level ADLs  Prognosis: Good  Assessment: Pt is a 33 y o  male who was [facility]/2018 with Multiple closed fractures of pelvis with unstable disruption of pelvic [place] (HCC) S/P ORIF   No significant PMH At baseline pt was completing adls and mobility independently  Pt lives with s/o in first floor apt with 6-7 chris  Currently pt requires max assist for overall ADLS and mod assist for functional mobility/transfers  Pt currently presents with impairments in the following categories -steps to enter environment, limited home support, difficulty performing ADLS and difficulty performing IADLS  activity tolerance, endurance, standing balance/tolerance and sitting balance/tolerance  These impairments, as well as pt's fatigue, pain, orthopedic restricitions , WBS , decreased caregiver support and risk for falls  limit pt's ability to safely engage in all baseline areas of occupation, includingbathing, dressing, toileting, functional mobility/transfers, social participation  and leisure activities  From OT standpoint, recommend inpt rehab  upon D/C  OT will continue to follow to address the below stated goals        OT Discharge Recommendation: Short Term Rehab

## 2018-09-28 NOTE — ASSESSMENT & PLAN NOTE
-orthopedics consulted, appreciate input  -postop day 1 with Orthopedics status post repair  -postoperative labs were stable  -hemoglobin trend is 10 10 6  -DC fluids today; patient tolerating p o   Intake  -postoperative check completed and patient was asymptomatic  -neurovascular checks
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
## 2018-09-28 NOTE — PROGRESS NOTES
Post-Op Note - Trauma   Fritz De La Vega 33 y o  male MRN: [id number]  Unit/Bed#: [facility] 919-01 Encounter: [id number]    Assessment: s/p ORIF pelvis anterior/posterior approach; ORIF pubic symphysis, closed reduction right posterior pelvic ring; orif anterior pelvic ring     Plan:    Pain control   Stand to transfer only on LLE   NWB RLE   DVT ppx: lovenox and SCDs    Subjective: My pain is ok    Objective:     Meds/Allergies   No prescriptions prior to admission  Vitals: Blood pressure (!) 177/85, pulse 88, temperature 98 7 °F (37 1 °C), temperature source Oral, resp  rate 17, height 5' 8" (1 727 m), weight 95 3 kg (210 lb), SpO2 100 %  Body mass index is 31 93 kg/m²  SpO2: SpO2: 100 %      Nutrition/GI Proph/Bowel Reg: regular diet    Physical Exam:   GENERAL APPEARANCE: no acute distress, laying comfortably in bed  HEENT: atraumatic, normocephalic  CV: RRR, no m/r/g  LUNGS: CTABL  ABD: soft, non-tender, non-distended  EXT: pelvis tender, dressing are dry, moves bl lower extremities, bl feet are warm and well perfused  NEURO: aaox3, gcs 15  SKIN: warm, dry      Lab Results:   BMP/CMP:   Lab Results   Component Value Date     (L) 09/27/2018    K 3 9 09/27/2018     09/27/2018    CO2 27 09/27/2018    BUN 8 09/27/2018    CREATININE 0 79 09/27/2018    CALCIUM 8 1 (L) 09/27/2018    EGFR 121 09/27/2018   , CBC:   Lab Results   Component Value Date    WBC 6 30 09/27/2018    HGB 10 8 (L) 09/27/2018    HCT 33 3 (L) 09/27/2018    MCV 89 09/27/2018     09/27/2018    MCH 29 1 09/27/2018    MCHC 32 6 09/27/2018    RDW 13 2 09/27/2018    MPV 11 1 09/27/2018    NRBC 0 09/27/2018    and Coagulation: No results found for: PT, INR, APTT  Imaging/EKG Studies: Results: I have personally reviewed pertinent reports        VTE Prophylaxis: lovenox, SCDs

## 2018-09-28 NOTE — PROGRESS NOTES
Progress Note - Ra Weaver 1990, 33 y o  male MRN: [id number]    Unit/Bed#: [facility] 919-01 Encounter: [id number]    Primary Care Provider: TOI Wright   Date and time admitted to hospital: 9/25/2018  4:20 PM        Left foot pain   Assessment & Plan    -tenderness palpation to the dorsal aspect of the left foot  -plain film negative  -neurovascularly intact     Acute blood loss anemia   Assessment & Plan    -continue to monitor  -a m  Labs  -vitals stable     Splenic laceration   Assessment & Plan    -hemoglobins are stable  -vitals are stable  -a m  Labs stable  -continue to monitor  -abdominal exam is stable     Right acetabular fracture [facility])   Assessment & Plan    -orthopedics consulted, appreciate input  -postop day 1 with Orthopedics status post repair  -postoperative labs were stable  -hemoglobin trend is 10 10 6  -DC fluids today; patient tolerating p o  Intake  -postoperative check completed and patient was asymptomatic  -neurovascular checks     * Multiple closed fractures of pelvis with unstable disruption of pelvic [place] (HCC)   Assessment & Plan    -orthopedics consulted, appreciate input  -postop day 1 with Orthopedics status post repair  -neurovascular checks       DVT prophylaxis: SCDs and Lovenox  PT and OT: eval and treat    Disposition:  PT and OT eval; case management for dispo planning  Continue to monitor labs  Subjective/Objective   Chief Complaint: "No new complaints "    Subjective:  Patient offers no new complaints  Reports he is feeling better  Reports he is ready to perform rehab  Objective:     Meds/Allergies   No prescriptions prior to admission  Vitals: Blood pressure 132/67, pulse 90, temperature 98 8 °F (37 1 °C), temperature source Oral, resp  rate 18, height 5' 8" (1 727 m), weight 95 3 kg (210 lb), SpO2 98 %  Body mass index is 31 93 kg/m²   SpO2: SpO2: 98 %, SpO2 Device: O2 Device: None (Room air)    ABG: No results found for: PHART, KWW9NMJ, PO2ART, WTH8QMU, Z9UXIDTC, BEART, SOURCE      Intake/Output Summary (Last 24 hours) at 09/28/18 0731  Last data filed at 09/28/18 3481   Gross per 24 hour   Intake             2890 ml   Output             3770 ml   Net             -880 ml       Invasive Devices     Peripheral Intravenous Line            Peripheral IV 09/25/18 Left Antecubital 2 days    Peripheral IV 09/25/18 Right Antecubital 2 days                Nutrition/GI Proph/Bowel Reg:  Continue current diet    Physical Exam:   GENERAL APPEARANCE:  No acute distress, well-appearing  HEENT:  Normocephalic, PERRLA  CV:  Regular rate rhythm, no split S1-S2  LUNGS:  CTA bilaterally, no rales or rhonchi  ABD:  Bowel sounds x4, nontender, nondistended, no rebound or guarding  EXT:  +2 pulses on extremities, neurovascularly intact  NEURO:  Cranial nerves 2-12 intact, no focal deficits  SKIN:  Warm, dry, intact    Lab Results:   Results: I have personally reviewed pertinent reports   , BMP/CMP: No results found for: NA, K, CL, CO2, ANIONGAP, BUN, CREATININE, GLUCOSE, CALCIUM, AST, ALT, ALKPHOS, PROT, ALBUMIN, BILITOT, EGFR and CBC:   Lab Results   Component Value Date    WBC 8 16 09/28/2018    HGB 10 6 (L) 09/28/2018    HCT 32 4 (L) 09/28/2018    MCV 89 09/28/2018     09/28/2018    MCH 29 2 09/28/2018    MCHC 32 7 09/28/2018    RDW 13 0 09/28/2018    MPV 10 9 09/28/2018    NRBC 0 09/28/2018     Imaging/EKG Studies: Results: I have personally reviewed pertinent reports  Other Studies:  No other studies  VTE Prophylaxis:  SCDs and Lovenox    Nurse provider rounds completed, plan of care discussed

## 2018-09-28 NOTE — SOCIAL WORK
Pt was recommended for rehab  CM met with pt to review the potential options  CM reviewed all acute and SNF options   Pt would like a referral to [facility] and [facility]
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
## 2018-09-28 NOTE — PROGRESS NOTES
Orthopedics   Fco Johnson 33 y o  male MRN: [id number]  Unit/Bed#: [facility] 919-01      Subjective:  33 y o male post operative day 1 ORIF pelvis (anterior/posterior approach) with ORIF of the pubic symphysis  Pain controlled currently  Some numbness down the LLE  No overnight events  Lombardo recently removed, no urinary complaints      Labs:    0  Lab Value Date/Time   HCT 33 3 (L) 09/27/2018 1112   HCT 33 1 (L) 09/27/2018 0504   HCT 34 3 (L) 09/26/2018 2032   HGB 10 8 (L) 09/27/2018 1112   HGB 10 8 (L) 09/27/2018 0504   HGB 11 1 (L) 09/26/2018 2032   INR 1 40 (H) 09/26/2018 0537   WBC 6 30 09/27/2018 0504   WBC 6 24 09/26/2018 0537   WBC 24 85 (H) 09/25/2018 1507       Meds:    Current Facility-Administered Medications:     acetaminophen (TYLENOL) tablet 975 mg, 975 mg, Oral, Q8H [facility], Camacho Weiss PA-C, 975 mg at 09/28/18 0536    ceFAZolin (ANCEF) IVPB (premix) 2,000 mg, 2,000 mg, Intravenous, Q8H, Ricki Montelongo PA-C, Last Rate: 100 mL/hr at 09/27/18 2246, 2,000 mg at 09/27/18 2246    docusate sodium (COLACE) capsule 100 mg, 100 mg, Oral, BID, Rolando Rivera PA-C, 100 mg at 09/27/18 1729    enoxaparin (LOVENOX) subcutaneous injection 40 mg, 40 mg, Subcutaneous, Q24H [facility], Lendon Aase, DO, 40 mg at 09/27/18 2206    HYDROmorphone (DILAUDID) injection 0 5 mg, 0 5 mg, Intravenous, Q3H PRN, David Brewster MD    lactated ringers infusion, 75 mL/hr, Intravenous, Continuous, Rolando Rivera PA-C, Last Rate: 75 mL/hr at 09/27/18 0042    methocarbamol (ROBAXIN) tablet 500 mg, 500 mg, Oral, Q6H PRN, Ama Rubio PA-C    nicotine (NICODERM CQ) 14 mg/24hr TD 24 hr patch 1 patch, 1 patch, Transdermal, Daily, Jose Yeung MD    oxyCODONE (ROXICODONE) immediate release tablet 10 mg, 10 mg, Oral, Q4H PRN, Jose Yeung MD, 10 mg at 09/28/18 0536    oxyCODONE (ROXICODONE) IR tablet 5 mg, 5 mg, Oral, Q4H PRN, Jose Yeung MD    [facility]) tablet 17 2 mg, 2 tablet, Oral, , Peggy ELE Panda, 17 2 mg at 09/27/18 2119    Blood Culture:   No results found for: BLOODCX    Wound Culture:   No results found for: WOUNDCULT    Ins and Outs:  I/O last 24 hours: In: 2746 3 [P O :720; I V :2026 3]  Out: 6992 [Urine:2995; Blood:75]          Physical Exam:  Vitals:    09/28/18 0255   BP: 142/81   Pulse: (!) 112   Resp: 18   Temp: 98 2 °F (36 8 °C)   SpO2: 99%     Pelvis:  · Dressings C/D/I  · Sensation slightly decreased left lateral thigh and medial calf  · Equal strength to ankle plantar flexion/dorsiflexion, EHL/FHL  · Toes warm and well perfused    _*_*_*_*_*_*_*_*_*_*_*_*_*_*_*_*_*_*_*_*_*_*_*_*_*_*_*_*_*_*_*_*_*_*_*_*_*_*_*_*_*    Assessment: 28 y o male post operative day  1 ORIF pelvis (anterior/posterior approach) with ORIF of the pubic symphysis    Doing well    Plan:  · NWB RLE, Stand to transfer only for LLE  · DVT prophylaxis  · Analgesics  · PT/OT  · Dispo: Ortho will follow  · Will continue to assess for acute blood loss anemia    Abhay Leonard PA-C

## 2018-09-28 NOTE — ASSESSMENT & PLAN NOTE
-hemoglobins are stable  -vitals are stable  -a m   Labs stable  -continue to monitor  -abdominal exam is stable

## 2018-09-28 NOTE — ASSESSMENT & PLAN NOTE
-orthopedics consulted, appreciate input  -postop day 1 with Orthopedics status post repair  -neurovascular checks
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
## 2018-09-28 NOTE — ASSESSMENT & PLAN NOTE
-tenderness palpation to the dorsal aspect of the left foot  -plain film negative  -neurovascularly intact

## 2018-09-29 LAB
ABO GROUP BLD BPU: NORMAL
ANION GAP SERPL CALCULATED.3IONS-SCNC: 6 MMOL/L (ref 4–13)
BASOPHILS # BLD AUTO: 0.02 THOUSANDS/ΜL (ref 0–0.1)
BASOPHILS NFR BLD AUTO: 0 % (ref 0–1)
BPU ID: NORMAL
BUN SERPL-MCNC: 11 MG/DL (ref 5–25)
CALCIUM SERPL-MCNC: 8.3 MG/DL (ref 8.3–10.1)
CHLORIDE SERPL-SCNC: 96 MMOL/L (ref 100–108)
CO2 SERPL-SCNC: 29 MMOL/L (ref 21–32)
CREAT SERPL-MCNC: 0.95 MG/DL (ref 0.6–1.3)
CROSSMATCH: NORMAL
EOSINOPHIL # BLD AUTO: 0.45 THOUSAND/ΜL (ref 0–0.61)
EOSINOPHIL NFR BLD AUTO: 6 % (ref 0–6)
ERYTHROCYTE [DISTWIDTH] IN BLOOD BY AUTOMATED COUNT: 13.2 % (ref 11.6–15.1)
GFR SERPL CREATININE-BSD FRML MDRD: 108 ML/MIN/1.73SQ M
GLUCOSE SERPL-MCNC: 118 MG/DL (ref 65–140)
HCT VFR BLD AUTO: 29.9 % (ref 36.5–49.3)
HGB BLD-MCNC: 9.8 G/DL (ref 12–17)
IMM GRANULOCYTES # BLD AUTO: 0.03 THOUSAND/UL (ref 0–0.2)
IMM GRANULOCYTES NFR BLD AUTO: 0 % (ref 0–2)
LYMPHOCYTES # BLD AUTO: 2.35 THOUSANDS/ΜL (ref 0.6–4.47)
LYMPHOCYTES NFR BLD AUTO: 33 % (ref 14–44)
MCH RBC QN AUTO: 29.3 PG (ref 26.8–34.3)
MCHC RBC AUTO-ENTMCNC: 32.8 G/DL (ref 31.4–37.4)
MCV RBC AUTO: 90 FL (ref 82–98)
MONOCYTES # BLD AUTO: 0.82 THOUSAND/ΜL (ref 0.17–1.22)
MONOCYTES NFR BLD AUTO: 12 % (ref 4–12)
NEUTROPHILS # BLD AUTO: 3.39 THOUSANDS/ΜL (ref 1.85–7.62)
NEUTS SEG NFR BLD AUTO: 49 % (ref 43–75)
NRBC BLD AUTO-RTO: 0 /100 WBCS
PLATELET # BLD AUTO: 257 THOUSANDS/UL (ref 149–390)
PMV BLD AUTO: 11.7 FL (ref 8.9–12.7)
POTASSIUM SERPL-SCNC: 4 MMOL/L (ref 3.5–5.3)
RBC # BLD AUTO: 3.34 MILLION/UL (ref 3.88–5.62)
SODIUM SERPL-SCNC: 131 MMOL/L (ref 136–145)
UNIT DISPENSE STATUS: NORMAL
UNIT PRODUCT CODE: NORMAL
UNIT RH: NORMAL
WBC # BLD AUTO: 7.06 THOUSAND/UL (ref 4.31–10.16)

## 2018-09-29 PROCEDURE — 85025 COMPLETE CBC W/AUTO DIFF WBC: CPT | Performed by: PHYSICIAN ASSISTANT

## 2018-09-29 PROCEDURE — 99024 POSTOP FOLLOW-UP VISIT: CPT | Performed by: PHYSICIAN ASSISTANT

## 2018-09-29 PROCEDURE — 99232 SBSQ HOSP IP/OBS MODERATE 35: CPT | Performed by: SURGERY

## 2018-09-29 PROCEDURE — 80048 BASIC METABOLIC PNL TOTAL CA: CPT | Performed by: PHYSICIAN ASSISTANT

## 2018-09-29 RX ADMIN — ACETAMINOPHEN 975 MG: 325 TABLET ORAL at 15:03

## 2018-09-29 RX ADMIN — OXYCODONE HYDROCHLORIDE 10 MG: 10 TABLET ORAL at 09:51

## 2018-09-29 RX ADMIN — OXYCODONE HYDROCHLORIDE 10 MG: 10 TABLET ORAL at 17:37

## 2018-09-29 RX ADMIN — SENNOSIDES 17.2 MG: 8.6 TABLET, FILM COATED ORAL at 21:51

## 2018-09-29 RX ADMIN — DOCUSATE SODIUM 100 MG: 100 CAPSULE, LIQUID FILLED ORAL at 17:37

## 2018-09-29 RX ADMIN — ACETAMINOPHEN 975 MG: 325 TABLET ORAL at 05:11

## 2018-09-29 RX ADMIN — DOCUSATE SODIUM 100 MG: 100 CAPSULE, LIQUID FILLED ORAL at 09:50

## 2018-09-29 RX ADMIN — ENOXAPARIN SODIUM 40 MG: 40 INJECTION SUBCUTANEOUS at 21:51

## 2018-09-29 RX ADMIN — ACETAMINOPHEN 975 MG: 325 TABLET ORAL at 21:51

## 2018-09-29 NOTE — PROGRESS NOTES
Trauma    Progress Note - Preeti Lucas 1990, 33 y o  male MRN: [id number]    Unit/Bed#: [facility] 919-01 Encounter: [id number]    Primary Care Provider: TOI Baker   Date and time admitted to hospital: 9/25/2018  4:20 PM        Right acetabular fracture [facility])   Assessment & Plan    - Postop day 2 with Orthopedics status post repair  - Hgb 10 6 yesterday  - neurovascular checks  - NWB RLE, LLE stand to transfer     Splenic laceration   Assessment & Plan    - Hemoglobins have been stable  - Vitals are stable  - F/u AM labs  - Continue to monitor  - Abdominal exam is stable     * Multiple closed fractures of pelvis with unstable disruption of pelvic Creek (HCC)   Assessment & Plan    - Postop day 2 with Orthopedics status post repair  - neurovascular checks  - NWB RLE, LLE stand to transfer LLE       Acute blood loss anemia   Assessment & Plan    -continue to monitor  -AM Labs  -vitals stable               Subjective/Objective   Chief Complaint: No complaints  Pain controlled  No fevers  Objective:     Meds/Allergies   No prescriptions prior to admission  Vitals: Blood pressure 130/62, pulse 90, temperature 98 5 °F (36 9 °C), temperature source Oral, resp  rate 18, height 5' 8" (1 727 m), weight 95 3 kg (210 lb), SpO2 98 %  Body mass index is 31 93 kg/m²  SpO2: SpO2: 98 %    ABG: No results found for: PHART, GZQ8RBO, PO2ART, CHQ6ESA, J6GUCBOC, BEART, SOURCE      Intake/Output Summary (Last 24 hours) at 09/29/18 0854  Last data filed at 09/29/18 0439   Gross per 24 hour   Intake             1181 ml   Output             1000 ml   Net              181 ml       Invasive Devices     Peripheral Intravenous Line            Peripheral IV 09/29/18 Left;Ventral (anterior) Forearm less than 1 day                Nutrition/GI Proph/Bowel Reg: Regular diet    Physical Exam:   GENERAL APPEARANCE: NAD, comfortable  HEENT: WNL  CV: RRR,   LUNGS: No distress, room air  ABD: Soft, non-tender   R flank w/o ecchymosis  EXT: RLE dressing c/d/i  Leg soft, compartments soft  NEURO: WNL  SKIN: WNL    Lab Results:   Results: I have personally reviewed pertinent reports   , BMP/CMP:   Lab Results   Component Value Date     (L) 09/29/2018    K 4 0 09/29/2018    CL 96 (L) 09/29/2018    CO2 29 09/29/2018    BUN 11 09/29/2018    CREATININE 0 95 09/29/2018    CALCIUM 8 3 09/29/2018    EGFR 108 09/29/2018    and CBC:   Lab Results   Component Value Date    WBC 7 06 09/29/2018    HGB 9 8 (L) 09/29/2018    HCT 29 9 (L) 09/29/2018    MCV 90 09/29/2018     09/29/2018    MCH 29 3 09/29/2018    MCHC 32 8 09/29/2018    RDW 13 2 09/29/2018    MPV 11 7 09/29/2018    NRBC 0 09/29/2018     Imaging/EKG Studies: Xr Chest Portable    Result Date: 9/26/2018  Impression: No acute cardiopulmonary disease  Workstation performed: [id number]     Xr Pelvis Complete 3+ Vw    Result Date: 9/28/2018  Impression: Fluoroscopic guidance provided for orthopedic surgical procedure  Please refer to the separate procedure notes for additional details  Workstation performed: [id number]     Xr Pelvis Complete 3+ Vw    Result Date: 9/25/2018  Impression: Known pelvic fractures as seen on the prior study  No leakage of contrast material from the bladder  Pubic symphysis and right SI joint diastases  Workstation performed: [id number]     Xr Elbow 3+ Views Left    Result Date: 9/25/2018  Impression: No acute osseous abnormality  Workstation performed: [id number]     Xr Knee 1 Or 2 Views Left    Result Date: 9/25/2018  Impression: No acute osseous abnormality  Workstation performed: [id number]     Xr Knee 1 Or 2 Vw Right    Result Date: 9/25/2018  Impression: No acute osseous abnormality  Workstation performed: [id number]     Xr Foot 3+ Vw Left    Result Date: 9/28/2018  Impression: No acute osseous abnormality  Workstation performed: [id number]     Ct Head Without Contrast    Result Date: 9/25/2018  Impression: No acute intracranial abnormality  Workstation performed: [id number]     Ct Cervical Spine Without Contrast    Result Date: 9/25/2018  Impression: No cervical spine fracture or traumatic malalignment  Workstation performed: [id number]     Xr Pelvis Ap Only 1 Or 2 Vw    Result Date: 9/25/2018  Impression: Nondisplaced comminuted acetabular fracture on the right  Widening of the pubic symphysis  The right SI joint diastases and fracture are not visualized due to contrast in a distended bladder Workstation performed: [id number]     Ct Chest Abdomen Pelvis W Contrast    Result Date: 9/25/2018  Impression: 1  Moderate sized splenic subcapsular hematoma without parenchymal laceration, active extravasation, or pseudoaneurysm (AAST grade 2 injury)  2   Diastasis of the right sacroiliac joint and pubic symphysis with displaced zone 1 right sacral alar fracture (Velázquez-Young AP Compression type II-III)  Small bilateral obturator hematomas show no active contrast administration or interval growth from recent prior when accounting for scanning angles  However, consider pelvic binder placement, as clinically indicated  3   Miniscule focus of perihepatic blood at the inferior tip of the liver without parenchymal laceration--increased from prior  4   Fat stranding in the urogenital triangle  This finding may suggest traumatic urethral injury  Consider retrograde urethrography and recommend care if attempting to catheterize the patient  5   Increasing stranding at the right renal hilum around the right renal vein and IVC  No kidney laceration or perinephric hematoma  No contour irregularity or extravasation from the adjacent vessels  No urinary extravasation or irregularity of the renal pelvis  This could represent local injury, perinephric fat contusion, or blood products tracking from the retroperitoneal hematoma, discussed below   6   L3 and L4 transverse process fractures with psoas hematoma and right retroperitoneal hematoma which is shown mild increase since recent prior  7   Nondisplaced, comminuted acetabular fracture with major transverse component and secondary posterior acetabular component  (Note: This was previously misclassified as T-shaped on the prior report)  8   No traumatic injuries demonstrated in the chest  Note:  I personally discussed this study with Melissa Arboleda on 9/25/2018 at 4:23 PM   Note:  I personally discussed this study with Dr Manuel Loja from Trauma Surgery on 9/25/2018 at 4:31 PM  Workstation performed: [id number]     Ct Abdomen Pelvis With Contrast    Result Date: 9/25/2018  Impression: 1  Subcapsular splenic hematoma contacting nearly half of the splenic surface (AAST grade 2 injury)  No parenchymal laceration or pseudoaneurysms demonstrated  2   Diastasis of the right sacroiliac joint and pubic symphysis with displaced zone 1 right sacral alar fracture (Velázquez-Young AP Compression type II-III)  Small bilateral obturator hematomas, greater on the right  No active contrast extravasation demonstrated, but consider pelvic binder placement, as clinically indicated  3   Fat stranding in the urogenital triangle  This finding may suggest traumatic urethral injury  Consider retrograde urethrography and recommend care if attempting to catheterize the patient  4   Nondisplaced T-shaped right acetabular fracture  5   Right transverse process fractures at L3 and L4  I personally discussed this study with Melissa Arboleda on 9/25/2018 at approximately 14:40   Workstation performed: [id number]     Other Studies: None  VTE Prophylaxis: Lovenox

## 2018-09-29 NOTE — ASSESSMENT & PLAN NOTE
- Hemoglobins have been stable  - Vitals are stable  - F/u AM labs  - Continue to monitor  - Abdominal exam is stable

## 2018-09-29 NOTE — PROGRESS NOTES
Orthopedics   Iraida Snow 33 y o  male MRN: [id number]  [facility]      Subjective:  33 y o male post operative day 2 ORIF pelvis (anterior/posterior approach) with ORIF of the pubic symphysis  Normal bowel/bladder habits  Numbness slightly improved  Pain controlled  Labs:    0  Lab Value Date/Time   HCT 29 9 (L) 09/29/2018 0458   HCT 32 4 (L) 09/28/2018 0635   HCT 33 3 (L) 09/27/2018 1112   HGB 9 8 (L) 09/29/2018 0458   HGB 10 6 (L) 09/28/2018 0635   HGB 10 8 (L) 09/27/2018 1112   INR 1 40 (H) 09/26/2018 0537   WBC 7 06 09/29/2018 0458   WBC 8 16 09/28/2018 0635   WBC 6 30 09/27/2018 0504       Meds:    Current Facility-Administered Medications:     acetaminophen (TYLENOL) tablet 975 mg, 975 mg, Oral, Q8H [address], Carin Weiss PA-C, 975 mg at 09/29/18 0511    docusate sodium (COLACE) capsule 100 mg, 100 mg, Oral, BID, Cassie Shay PA-C, 100 mg at 09/28/18 1820    enoxaparin (LOVENOX) subcutaneous injection 40 mg, 40 mg, Subcutaneous, Q24H [address], Filipe Harmon DO, 40 mg at 09/28/18 2014    HYDROmorphone (DILAUDID) injection 0 5 mg, 0 5 mg, Intravenous, Q3H PRN, Carmelo Crandall MD    methocarbamol (ROBAXIN) tablet 500 mg, 500 mg, Oral, Q6H PRN, Christian Rocha PA-C    nicotine (NICODERM CQ) 14 mg/24hr TD 24 hr patch 1 patch, 1 patch, Transdermal, Daily, Carmella Mcclelland MD    oxyCODONE (ROXICODONE) immediate release tablet 10 mg, 10 mg, Oral, Q4H PRN, Carmella Mcclelland MD, 10 mg at 09/28/18 1820    oxyCODONE (ROXICODONE) IR tablet 5 mg, 5 mg, Oral, Q4H PRN, Carmella Mcclelland MD    [facility]) tablet 17 2 mg, 2 tablet, Oral, HS, Cassie Shay PA-C, 17 2 mg at 09/28/18 2114    Blood Culture:   No results found for: BLOODCX    Wound Culture:   No results found for: WOUNDCULT    Ins and Outs:  I/O last 24 hours:   In: 2621 [P O :2621]  Out: 3600 [Urine:3600]          Physical Exam:  Vitals:    09/28/18 2320   BP: 137/63   Pulse: 101   Resp: 18   Temp: 99 1 °F (37 3 °C)   SpO2: 99% Pelvis:  · Dressings C/D/I  · Decreased sensation left lateral thigh and medial calf (improved today)  · + ankle dorsiflexion/plantar flexion, +EHL/FHL bilaterally  · Sensation intact distally  · 2+ pulses    _*_*_*_*_*_*_*_*_*_*_*_*_*_*_*_*_*_*_*_*_*_*_*_*_*_*_*_*_*_*_*_*_*_*_*_*_*_*_*_*_*    Assessment: 28 y o male post operative day 2 ORIF pelvis (anterior/posterior approach) with ORIF of the pubic symphysis       Doing well    Plan:  · STT LLE and NWB RLE  · DVT prophylaxis  · Analgesics  · PT/OT  · Dispo: Ortho will follow  · Will continue to assess for acute blood loss anemia    Alannah Almonte PA-C

## 2018-09-29 NOTE — ASSESSMENT & PLAN NOTE
- Postop day 2 with Orthopedics status post repair  - neurovascular checks  - NWB RLE, LLE stand to transfer LLE

## 2018-09-30 LAB
ANION GAP SERPL CALCULATED.3IONS-SCNC: 7 MMOL/L (ref 4–13)
BASOPHILS # BLD AUTO: 0.04 THOUSANDS/ΜL (ref 0–0.1)
BASOPHILS NFR BLD AUTO: 1 % (ref 0–1)
BUN SERPL-MCNC: 15 MG/DL (ref 5–25)
CALCIUM SERPL-MCNC: 8.6 MG/DL (ref 8.3–10.1)
CHLORIDE SERPL-SCNC: 100 MMOL/L (ref 100–108)
CO2 SERPL-SCNC: 29 MMOL/L (ref 21–32)
CREAT SERPL-MCNC: 0.79 MG/DL (ref 0.6–1.3)
EOSINOPHIL # BLD AUTO: 0.5 THOUSAND/ΜL (ref 0–0.61)
EOSINOPHIL NFR BLD AUTO: 8 % (ref 0–6)
ERYTHROCYTE [DISTWIDTH] IN BLOOD BY AUTOMATED COUNT: 13.5 % (ref 11.6–15.1)
GFR SERPL CREATININE-BSD FRML MDRD: 121 ML/MIN/1.73SQ M
GLUCOSE SERPL-MCNC: 103 MG/DL (ref 65–140)
HCT VFR BLD AUTO: 29.7 % (ref 36.5–49.3)
HGB BLD-MCNC: 9.4 G/DL (ref 12–17)
IMM GRANULOCYTES # BLD AUTO: 0.03 THOUSAND/UL (ref 0–0.2)
IMM GRANULOCYTES NFR BLD AUTO: 1 % (ref 0–2)
LYMPHOCYTES # BLD AUTO: 2.18 THOUSANDS/ΜL (ref 0.6–4.47)
LYMPHOCYTES NFR BLD AUTO: 35 % (ref 14–44)
MCH RBC QN AUTO: 28.7 PG (ref 26.8–34.3)
MCHC RBC AUTO-ENTMCNC: 31.6 G/DL (ref 31.4–37.4)
MCV RBC AUTO: 91 FL (ref 82–98)
MONOCYTES # BLD AUTO: 0.73 THOUSAND/ΜL (ref 0.17–1.22)
MONOCYTES NFR BLD AUTO: 12 % (ref 4–12)
NEUTROPHILS # BLD AUTO: 2.76 THOUSANDS/ΜL (ref 1.85–7.62)
NEUTS SEG NFR BLD AUTO: 43 % (ref 43–75)
NRBC BLD AUTO-RTO: 0 /100 WBCS
PLATELET # BLD AUTO: 280 THOUSANDS/UL (ref 149–390)
PMV BLD AUTO: 10.6 FL (ref 8.9–12.7)
POTASSIUM SERPL-SCNC: 3.8 MMOL/L (ref 3.5–5.3)
RBC # BLD AUTO: 3.28 MILLION/UL (ref 3.88–5.62)
SODIUM SERPL-SCNC: 136 MMOL/L (ref 136–145)
WBC # BLD AUTO: 6.24 THOUSAND/UL (ref 4.31–10.16)

## 2018-09-30 PROCEDURE — 80048 BASIC METABOLIC PNL TOTAL CA: CPT | Performed by: PHYSICIAN ASSISTANT

## 2018-09-30 PROCEDURE — 97110 THERAPEUTIC EXERCISES: CPT

## 2018-09-30 PROCEDURE — 97112 NEUROMUSCULAR REEDUCATION: CPT

## 2018-09-30 PROCEDURE — 97530 THERAPEUTIC ACTIVITIES: CPT

## 2018-09-30 PROCEDURE — 85025 COMPLETE CBC W/AUTO DIFF WBC: CPT | Performed by: SURGERY

## 2018-09-30 PROCEDURE — 97535 SELF CARE MNGMENT TRAINING: CPT

## 2018-09-30 RX ADMIN — ACETAMINOPHEN 975 MG: 325 TABLET ORAL at 05:46

## 2018-09-30 RX ADMIN — DOCUSATE SODIUM 100 MG: 100 CAPSULE, LIQUID FILLED ORAL at 17:38

## 2018-09-30 RX ADMIN — SENNOSIDES 17.2 MG: 8.6 TABLET, FILM COATED ORAL at 22:29

## 2018-09-30 RX ADMIN — ENOXAPARIN SODIUM 40 MG: 40 INJECTION SUBCUTANEOUS at 22:28

## 2018-09-30 RX ADMIN — OXYCODONE HYDROCHLORIDE 10 MG: 10 TABLET ORAL at 18:51

## 2018-09-30 RX ADMIN — DOCUSATE SODIUM 100 MG: 100 CAPSULE, LIQUID FILLED ORAL at 09:31

## 2018-09-30 RX ADMIN — ACETAMINOPHEN 975 MG: 325 TABLET ORAL at 22:28

## 2018-09-30 NOTE — PLAN OF CARE
Problem: OCCUPATIONAL THERAPY ADULT  Goal: Performs self-care activities at highest level of function for planned discharge setting  See evaluation for individualized goals  Treatment Interventions: ADL retraining, Functional transfer training, Endurance training, Patient/family training, Equipment evaluation/education, Compensatory technique education, Activityengagement          See flowsheet documentation for full assessment, interventions and recommendations  Outcome: Progressing  Limitation: Decreased ADL status, Decreased endurance, Decreased self-care trans, Decreased high-level ADLs  Prognosis: Good  Assessment: pt  seen for PM OT session focusing on UB/LB dressing, bed mobility, transfers and wheelchair mgmt  pt  requires supervision for UB dressing and Max Asst for LB dressing to don pants and socks  pt  performed bed rolling with supervision and required Mod Asst for supine -> sit for RLE mgmt  pt  performed transfers with Min Asst x 2 (2nd for RLE mgmt) to w/c  pt  educated on w/c mgmt  pt  performed functional mobility using a w/c with Max Asst to manage leg rests and supervision to use breaks  pt  is motivated and pleasant  pt  will continue to benefit from skilled OT service to maximize independence        OT Discharge Recommendation: Short Term Rehab    Anirudh Muhammad

## 2018-09-30 NOTE — ASSESSMENT & PLAN NOTE
- Postop day 3 with Orthopedics status post repair  - neurovascular checks  - NWB RLE, LLE stand to transfer LLE

## 2018-09-30 NOTE — PLAN OF CARE
Problem: PHYSICAL THERAPY ADULT  Goal: Performs mobility at highest level of function for planned discharge setting  See evaluation for individualized goals  Treatment/Interventions: Functional transfer training, LE strengthening/ROM, Therapeutic exercise, Endurance training, Patient/family training, Equipment eval/education, Bed mobility, Gait training, Compensatory technique education, Spoke to nursing, Spoke to case management, OT  Equipment Recommended: Wheelchair       See flowsheet documentation for full assessment, interventions and recommendations  Outcome: Progressing  Prognosis: Good  Problem List: Decreased strength, Decreased endurance, Impaired balance, Decreased mobility, Decreased range of motion, Decreased skin integrity, Orthopedic restrictions, Pain  Assessment: Pt engaged in PT treatment session focussig no LE threx, bed mobility and transfers  Pt remains with pain in RLE>LLE in which he requires assistace for mangement during bed mobility and transfers  LE therex performed supine and seated to increase LE strength and ROM as well as to decrease pain  Session limited by arrival of breakfast; obtanied WC for patient to trial as able  Gait not appropriate at this time  Patient will continue to require skilled IP PT intervention at this time to progress functional mobility (I) and safety  Rehab recommended at this time  Barriers to Discharge: Inaccessible home environment  Barriers to Discharge Comments: 6-7 YASMINE, SO working during the day   Recommendation: Post acute IP rehab     PT - OK to Discharge: Yes (to rehab when medically appropriate )    See flowsheet documentation for full assessment

## 2018-09-30 NOTE — ASSESSMENT & PLAN NOTE
- Postop day 3 with Orthopedics status post repair  - Hgb 9 4  - neurovascular checks  - NWB RLE, LLE stand to transfer

## 2018-09-30 NOTE — PHYSICAL THERAPY NOTE
PT TREATMENT      09/30/18 0854   Pain Assessment   Pain Assessment 0-10   Pain Score 5   Pain Type Acute pain   Pain Location Hip;Pelvis   Pain Orientation Bilateral   Effect of Pain on Daily Activities impaired tolerance to change in position    Patient's Stated Pain Goal No pain   Hospital Pain Intervention(s) Cold applied;Repositioned;Elevated   Response to Interventions ice donned post session- tolerated repositioning  Utilized soft care cusion to alleviate buttocks pain OOB in chair    Restrictions/Precautions   Weight Bearing Precautions Per Order Yes   RLE Weight Bearing Per Order NWB   LLE Weight Bearing Per Order Other  (stand to transfer only )   Other Precautions WBS; Fall Risk;Pain   General   Chart Reviewed Yes   Additional Pertinent History 09/27/18 OPEN REDUCTION W/ INTERNAL FIXATION (ORIF) PELVIS ANTERIOR/POSTERIOR APPROACH, ORIF PUBIC SYMPHYSIS    Response to Previous Treatment Patient with no complaints from previous session  Family/Caregiver Present Yes  (SO)   Cognition   Overall Cognitive Status WFL   Arousal/Participation Alert   Attention Within functional limits   Orientation Level Oriented X4   Memory Within functional limits   Following Commands Follows all commands and directions without difficulty   Comments Pt able to recall and demontrate understanding WBS   Subjective   Subjective Patient agreeable to particiapte in PT session    Bed Mobility   Supine to Sit 3  Moderate assistance   Additional items Assist x 1; Increased time required;LE management   Additional Comments Assistance provided for RLE management    Transfers   Sit to Stand 4  Minimal assistance   Additional items Assist x 1; Increased time required;Verbal cues   Stand to Sit 4  Minimal assistance   Additional items Assist x 1; Increased time required;Verbal cues   Stand pivot 3  Moderate assistance   Additional items Increased time required  (LE management )   Ambulation/Elevation   Gait pattern Not appropriate; Not tested   Balance   Static Sitting Good   Dynamic Sitting Fair +   Static Standing Fair   Dynamic Standing Fair   Ambulatory Fair -   Endurance Deficit   Endurance Deficit Yes   Endurance Deficit Description SpO2 dropping to appx 84% at the end of session in which he was able to increase to WNL with pursed lip breathing; patient admits to holding his breath   Activity Tolerance   Activity Tolerance Patient limited by fatigue;Patient limited by pain;Treatment limited secondary to medical complications (Comment)   Medical Staff Made Aware April, COTA    Exercises   Hip Flexion Sitting;20 reps;AAROM; Bilateral   Hip Abduction Sitting;20 reps;AROM; Bilateral   Hip Adduction Sitting;20 reps;AROM; Bilateral   Knee AROM Long Arc Quad Sitting;20 reps;AAROM; Bilateral   Ankle Pumps Supine;20 reps;AROM; Bilateral   Balance training  2x sit<->Stands from varied surfaces    Assessment   Prognosis Good   Problem List Decreased strength;Decreased endurance; Impaired balance;Decreased mobility; Decreased range of motion;Decreased skin integrity;Orthopedic restrictions;Pain   Assessment Pt engaged in PT treatment session focussig no LE threx, bed mobility and transfers  Pt remains with pain in RLE>LLE in which he requires assistace for mangement during bed mobility and transfers  LE therex performed supine and seated to increase LE strength and ROM as well as to decrease pain  Session limited by arrival of breakfast; obtanied WC for patient to trial as able  Gait not appropriate at this time  Patient will continue to require skilled IP PT intervention at this time to progress functional mobility (I) and safety  Rehab recommended at this time  Barriers to Discharge Comments 6-7 YASMINE, SO working during the day    Goals   Patient Goals Patient would like to go to rehab    STG Expiration Date 10/08/18   Short Term Goal #1 Pt will be mod(I) with rolling R and L for ease with OOB transfer  Pt will be mod(I) with supine<->sit transfer   Pt will be mod(I) with sit<->stand to promote (I) with toileting  Pt will be mod(I) with stand pivot transfers to facilitate OOB  Pt will be mod(I) with w/c parts management and mobilization of of >50' to access household distances  Pt will recall and demonstrate understanding of % without verbal instruction  Pt will particiapte in HEP consisitng of balance, strength, ROM and coordinaiton tasks to increase activitiy, improve (I) and decrease pain  Treatment Day 1   Plan   Treatment/Interventions Functional transfer training;LE strengthening/ROM; Elevations; Therapeutic exercise; Endurance training;Patient/family training;Equipment eval/education; Bed mobility; Compensatory technique education;Spoke to nursing;Spoke to case management;OT   Progress Progressing toward goals   PT Frequency Other (Comment)  (3-6x/wk)   Recommendation   Recommendation Post acute IP rehab   Equipment Recommended Wheelchair   PT - OK to Discharge Yes  (to rehab when medically appropriate )   Additional Comments OOB in chair with all needs wtevangelist Philip, PT

## 2018-09-30 NOTE — PROGRESS NOTES
Subjective: No acute events overnight  No acute distress       Objective:  Lab Results   Component Value Date/Time    WBC 6 24 09/30/2018 05:09 AM    HGB 9 4 (L) 09/30/2018 05:09 AM       Vitals:    09/30/18 0856   BP:    Pulse:    Resp:    Temp:    SpO2: 100%     Pelvis  Dressing c/d/i  Motor & sensation grossly intact  Extremity warm and well perfused    Assessment: Post op from ORIF pubic symphysis and SI screw right side    Plan:  STT LLE and NWB RLE  Pain control  DVT ppx  PT  Dispo

## 2018-09-30 NOTE — OCCUPATIONAL THERAPY NOTE
Occupational Therapy Treatment Note:       09/30/18 1443   Restrictions/Precautions   Weight Bearing Precautions Per Order Yes   RUE Weight Bearing Per Order WBAT   LUE Weight Bearing Per Order WBAT   RLE Weight Bearing Per Order NWB   LLE Weight Bearing Per Order Other  (stand to transfer only)   Other Precautions Fall Risk;WBS;Pain   Pain Assessment   Pain Assessment 0-10   Pain Score 4   ADL   UB Dressing Assistance 5  Supervision/Setup   UB Dressing Deficit Setup   UB Dressing Comments seated in w/c   LB Dressing Assistance 2  Maximal Assistance   LB Dressing Deficit Thread LLE into pants; Thread RLE into pants;Pull up over hips; Increased time to complete; Requires assistive device for steadying   LB Dressing Comments seated EOB   Bed Mobility   Rolling L 5  Supervision   Additional items HOB elevated; Bedrails; Increased time required   Supine to Sit 3  Moderate assistance   Additional items LE management; Increased time required;HOB elevated;Assist x 1   Transfers   Sit to Stand 4  Minimal assistance   Additional items HOB elevated; Increased time required   Stand to Sit 4  Minimal assistance   Additional items Increased time required   Stand pivot 4  Minimal assistance   Additional items Assist x 2;Assist x 1  (2nd for LE mgmt)   Additional Comments pt  holds breath during transfers and requires min vc's for breathing techniques  stand to transfer only  Functional Mobility   Functional Mobility 5  Supervision   Additional items (Wheelchair level)   Cognition   Overall Cognitive Status WFL   Arousal/Participation Alert; Responsive; Cooperative   Attention Within functional limits   Orientation Level Oriented X4   Memory Within functional limits   Following Commands Follows all commands and directions without difficulty   Activity Tolerance   Activity Tolerance Patient tolerated treatment well   Assessment   Assessment pt  seen for PM OT session focusing on UB/LB dressing, bed mobility, transfers and wheelchair mgmt  pt  requires supervision for UB dressing and Max Asst for LB dressing to don pants and socks  pt  performed bed rolling with supervision and required Mod Asst for supine -> sit for RLE mgmt  pt  performed transfers with Min Asst x 2 (2nd for RLE mgmt) to w/c  pt  educated on w/c mgmt  pt  performed functional mobility using a w/c with Max Asst to manage leg rests and supervision to use breaks  pt  is motivated and pleasant  pt  will continue to benefit from skilled OT service to maximize independence  Plan   Treatment Interventions ADL retraining;Functional transfer training; Endurance training;Equipment evaluation/education; Compensatory technique education; Energy conservation; Activityengagement   Goal Expiration Date 10/12/18   Treatment Day 1   OT Frequency 3-5x/wk   Recommendation   OT Discharge Recommendation Short Term Rehab   Barthel Index   Feeding 10   Bathing 0   Grooming Score 5   Dressing Score 5   Bladder Score 10   Bowels Score 10   Toilet Use Score 5   Transfers (Bed/Chair) Score 10   Mobility (Level Surface) Score 0   Stairs Score 0   Barthel Index Score 55   Eneida Lewis

## 2018-09-30 NOTE — ASSESSMENT & PLAN NOTE
- Hemoglobins have been slowly down trending  -today, hgb 9 4 from 9 8, but hemodynamically stable  - Continue to monitor  - Abdominal exam is stable

## 2018-09-30 NOTE — PROGRESS NOTES
Progress Note - Rafaela Borrego 1990, 33 y o  male MRN: [id number]    Unit/Bed#: [facility] 919-01 Encounter: [id number]    Primary Care Provider: TOI Holt   Date and time admitted to hospital: 9/25/2018  4:20 PM        Left foot pain   Assessment & Plan    -tenderness palpation to the dorsal aspect of the left foot  -plain film negative  -neurovascularly intact     Acute blood loss anemia   Assessment & Plan    -continue to monitor  -vitals stable     Splenic laceration   Assessment & Plan    - Hemoglobins have been slowly down trending  -today, hgb 9 4 from 9 8, but hemodynamically stable  - Continue to monitor  - Abdominal exam is stable     Right acetabular fracture (HCC)   Assessment & Plan    - Postop day 3 with Orthopedics status post repair  - Hgb 9 4  - neurovascular checks  - NWB RLE, LLE stand to transfer     * Multiple closed fractures of pelvis with unstable disruption of pelvic [place] (HCC)   Assessment & Plan    - Postop day 3 with Orthopedics status post repair  - neurovascular checks  - NWB RLE, LLE stand to transfer LLE                 Subjective/Objective   Chief Complaint: "my right leg hurts"    Subjective: No acute overnight events  Objective:     Meds/Allergies   No prescriptions prior to admission  Vitals: Blood pressure 126/66, pulse 71, temperature 98 °F (36 7 °C), temperature source Oral, resp  rate 18, height 5' 8" (1 727 m), weight 95 3 kg (210 lb), SpO2 97 %  Body mass index is 31 93 kg/m²   SpO2: SpO2: 97 %    ABG: No results found for: PHART, IBE6MZA, PO2ART, TZN3EIU, N4QCERJT, BEART, SOURCE      Intake/Output Summary (Last 24 hours) at 09/30/18 0728  Last data filed at 09/29/18 2015   Gross per 24 hour   Intake              810 ml   Output                0 ml   Net              810 ml       Invasive Devices     Peripheral Intravenous Line            Peripheral IV 09/29/18 Left;Ventral (anterior) Forearm 1 day                Nutrition/GI Proph/Bowel Reg: regular diet    Physical Exam:   GENERAL APPEARANCE:  Lying comfortably in bed, no acute distress  HEENT:  Normocephalic, atraumatic  CV:  Regular rate and rhythm, no murmur/rubs/gallops  LUNGS:  Unlabored respirations, equal bilaterally  ABD:  Soft, nontender, nondistended   EXT:  The right lower extremity  has soft compartments, foot is warm and well perfused  NEURO:  GCS 15, nonfocal  SKIN:  Warm, dry    Lab Results:   BMP/CMP:   Lab Results   Component Value Date     09/30/2018    K 3 8 09/30/2018     09/30/2018    CO2 29 09/30/2018    BUN 15 09/30/2018    CREATININE 0 79 09/30/2018    CALCIUM 8 6 09/30/2018    EGFR 121 09/30/2018   , CBC:   Lab Results   Component Value Date    WBC 6 24 09/30/2018    HGB 9 4 (L) 09/30/2018    HCT 29 7 (L) 09/30/2018    MCV 91 09/30/2018     09/30/2018    MCH 28 7 09/30/2018    MCHC 31 6 09/30/2018    RDW 13 5 09/30/2018    MPV 10 6 09/30/2018    NRBC 0 09/30/2018    and Coagulation: No results found for: PT, INR, APTT  Imaging/EKG Studies: Results: I have personally reviewed pertinent reports        VTE Prophylaxis: lovenox

## 2018-10-01 VITALS
HEIGHT: 68 IN | TEMPERATURE: 98.7 F | DIASTOLIC BLOOD PRESSURE: 63 MMHG | HEART RATE: 83 BPM | SYSTOLIC BLOOD PRESSURE: 141 MMHG | RESPIRATION RATE: 18 BRPM | OXYGEN SATURATION: 99 % | WEIGHT: 210 LBS | BODY MASS INDEX: 31.83 KG/M2

## 2018-10-01 LAB
ERYTHROCYTE [DISTWIDTH] IN BLOOD BY AUTOMATED COUNT: 13.7 % (ref 11.6–15.1)
HCT VFR BLD AUTO: 29.8 % (ref 36.5–49.3)
HGB BLD-MCNC: 9.6 G/DL (ref 12–17)
MCH RBC QN AUTO: 29.2 PG (ref 26.8–34.3)
MCHC RBC AUTO-ENTMCNC: 32.2 G/DL (ref 31.4–37.4)
MCV RBC AUTO: 91 FL (ref 82–98)
PLATELET # BLD AUTO: 297 THOUSANDS/UL (ref 149–390)
PMV BLD AUTO: 10.1 FL (ref 8.9–12.7)
RBC # BLD AUTO: 3.29 MILLION/UL (ref 3.88–5.62)
WBC # BLD AUTO: 7.73 THOUSAND/UL (ref 4.31–10.16)

## 2018-10-01 PROCEDURE — 85027 COMPLETE CBC AUTOMATED: CPT | Performed by: PHYSICIAN ASSISTANT

## 2018-10-01 PROCEDURE — 99024 POSTOP FOLLOW-UP VISIT: CPT | Performed by: PHYSICIAN ASSISTANT

## 2018-10-01 PROCEDURE — 97112 NEUROMUSCULAR REEDUCATION: CPT

## 2018-10-01 PROCEDURE — 99238 HOSP IP/OBS DSCHRG MGMT 30/<: CPT | Performed by: PHYSICIAN ASSISTANT

## 2018-10-01 PROCEDURE — 97110 THERAPEUTIC EXERCISES: CPT

## 2018-10-01 RX ORDER — NICOTINE 21 MG/24HR
1 PATCH, TRANSDERMAL 24 HOURS TRANSDERMAL DAILY
Qty: 28 PATCH | Refills: 0 | Status: SHIPPED | OUTPATIENT
Start: 2018-10-02

## 2018-10-01 RX ORDER — DOCUSATE SODIUM 100 MG/1
100 CAPSULE, LIQUID FILLED ORAL 2 TIMES DAILY
Qty: 10 CAPSULE | Refills: 0 | Status: SHIPPED | OUTPATIENT
Start: 2018-10-01 | End: 2018-11-20 | Stop reason: ALTCHOICE

## 2018-10-01 RX ORDER — METHOCARBAMOL 500 MG/1
500 TABLET, FILM COATED ORAL EVERY 6 HOURS PRN
Qty: 30 TABLET | Refills: 0 | Status: SHIPPED | OUTPATIENT
Start: 2018-10-01

## 2018-10-01 RX ORDER — SENNOSIDES 8.6 MG
2 TABLET ORAL
Qty: 120 EACH | Refills: 0 | Status: SHIPPED | OUTPATIENT
Start: 2018-10-01

## 2018-10-01 RX ORDER — OXYCODONE HYDROCHLORIDE 5 MG/1
5 TABLET ORAL EVERY 4 HOURS PRN
Qty: 30 TABLET | Refills: 0 | Status: SHIPPED | OUTPATIENT
Start: 2018-10-01 | End: 2018-10-11

## 2018-10-01 RX ADMIN — ACETAMINOPHEN 975 MG: 325 TABLET ORAL at 13:36

## 2018-10-01 RX ADMIN — DOCUSATE SODIUM 100 MG: 100 CAPSULE, LIQUID FILLED ORAL at 08:36

## 2018-10-01 RX ADMIN — DOCUSATE SODIUM 100 MG: 100 CAPSULE, LIQUID FILLED ORAL at 18:48

## 2018-10-01 RX ADMIN — METHOCARBAMOL 500 MG: 500 TABLET ORAL at 08:37

## 2018-10-01 RX ADMIN — ACETAMINOPHEN 975 MG: 325 TABLET ORAL at 05:37

## 2018-10-01 RX ADMIN — METHOCARBAMOL 500 MG: 500 TABLET ORAL at 13:36

## 2018-10-01 NOTE — CASE MANAGEMENT
Continued Stay Review    Date: 9/30    Vital Signs: Blood pressure 126/66, pulse 71, temperature 98 °F (36 7 °C), temperature source Oral, resp  rate 18, height 5' 8" (1 727 m), weight 95 3 kg (210 lb), SpO2 97 %  Body mass index is 31 93 kg/m²  SpO2: SpO2: 97 %       Medications:   Scheduled Meds:   Current Facility-Administered Medications:  acetaminophen 975 mg Oral Q8H SONIA   docusate sodium 100 mg Oral BID   enoxaparin 40 mg Subcutaneous Q24H SONIA   senna 2 tablet Oral HS     Continuous Infusions:    PRN Meds:   HYDROmorphone    methocarbamol    oxyCODONE po x1    Abnormal Labs/Diagnostic Results:    09/30/18 0509    WBC 4 31 - 10 16 Thousand/uL 6 24    RBC 3 88 - 5 62 Million/uL 3 28     Hemoglobin 12 0 - 17 0 g/dL 9 4     Hematocrit 36 5 - 49 3 % 29 7         Age/Sex: 33 y o  male     Assessment/Plan:   Splenic Laceration/ Acute blood loss anemia  Right Acetabular Fracture - POD#3  Multiple closed fractures of pelvis with unstable disruption of pelvic [place]  Hemoglobins slowly trending down and continue to monitor  Neurovascular checks   NWB RLJOLLY, LLE stand to transfer LLE    Discharge Plan: Referral to [facility] and [facility]

## 2018-10-01 NOTE — ASSESSMENT & PLAN NOTE
- Postop day 4 with Orthopedics status post repair  - Hgb 9 4  - neurovascular checks  - NWB RLE, LLE stand to transfer
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
## 2018-10-01 NOTE — PLAN OF CARE
Problem: PHYSICAL THERAPY ADULT  Goal: Performs mobility at highest level of function for planned discharge setting  See evaluation for individualized goals  Treatment/Interventions: Functional transfer training, LE strengthening/ROM, Therapeutic exercise, Endurance training, Patient/family training, Equipment eval/education, Bed mobility, Gait training, Compensatory technique education, Spoke to nursing, Spoke to case management, OT  Equipment Recommended: Wheelchair       See flowsheet documentation for full assessment, interventions and recommendations  Outcome: Progressing  Prognosis: Good  Problem List: Decreased strength, Decreased endurance, Impaired balance, Decreased mobility, Pain, Orthopedic restrictions  Assessment: Patient tolerated session well  He reports pain in his RLE, L knee and low back  He participated in supine there-ex with AAROM at times  Pain limited there-ex  He was able to perform bed mobility and a SPT to a w/c  Verbal cues were provided throughout for technique for to maintain NWB when in stance  He has good UE strength for maintaining NWB  He continues to present with decreased endurance, strength, balance and mobility  He will continue to benefit from P T  During hospital stay and will need rehab at discharge  Barriers to Discharge: Inaccessible home environment, Decreased caregiver support  Barriers to Discharge Comments: Steps to enter  Recommendation: Post acute IP rehab     PT - OK to Discharge: Yes (to rehab when medically appropriate )    See flowsheet documentation for full assessment

## 2018-10-01 NOTE — ASSESSMENT & PLAN NOTE
- Postop day 4 with Orthopedics status post repair  - neurovascular checks  - NWB RLE, LLE stand to transfer LLE

## 2018-10-01 NOTE — PHYSICAL THERAPY NOTE
PHYSICAL THERAPY NOTE       10/01/18 1225   Pain Assessment   Pain Assessment 0-10   Pain Score 5   Pain Location Leg;Back;Knee   Pain Orientation Other (Comment)  (RLE, L knee, low back)   Restrictions/Precautions   Weight Bearing Precautions Per Order Yes   RUE Weight Bearing Per Order WBAT   LUE Weight Bearing Per Order WBAT   RLE Weight Bearing Per Order NWB   LLE Weight Bearing Per Order Other  (stand to transfer only)   Other Precautions Pain; Fall Risk;Telemetry;WBS   General   Chart Reviewed Yes   Response to Previous Treatment Patient with no complaints from previous session  Family/Caregiver Present Yes   Cognition   Overall Cognitive Status WFL   Arousal/Participation Alert; Responsive   Attention Within functional limits   Orientation Level Oriented X4   Memory Within functional limits   Following Commands Follows all commands and directions without difficulty   Subjective   Subjective "I feel ok right now  I'm just tired "   Bed Mobility   Supine to Sit 4  Minimal assistance   Additional items Assist x 1;Bedrails;HOB elevated;Verbal cues;LE management   Additional Comments Assist with the RLE  Vc for technique   Transfers   Sit to Stand 4  Minimal assistance   Additional items Assist x 1; Armrests; Increased time required; Other  (height of bed elevated)   Stand to Sit 4  Minimal assistance   Additional items Assist x 1; Armrests; Verbal cues; Other  (Assist with the RLE)   Stand pivot 4  Minimal assistance   Additional items Assist x 1; Increased time required;Verbal cues  (Use of RW for assist  VC to maintain NWB on the RLE  )   Additional Comments Transfer into w/c     Ambulation/Elevation   Gait pattern (No appropriate due to mobility restrictions)   Balance   Static Sitting Good   Dynamic Sitting Fair +   Static Standing Fair   Dynamic Standing Fair   Ambulatory Fair -   Endurance Deficit   Endurance Deficit Yes   Endurance Deficit Description easily fatigued and limited by pain   Activity Tolerance   Activity Tolerance Patient limited by fatigue;Patient limited by pain   Nurse Made Aware INES [place]   Exercises   Quad Sets Supine;5 reps;Bilateral   Heelslides Supine;5 reps;Bilateral  (AAROM on the R)   Glute Sets Supine;5 reps;Bilateral   Hip Abduction Supine;10 reps;Bilateral  (AAROM B/L)   Ankle Pumps Supine;20 reps;Bilateral   Assessment   Prognosis Good   Problem List Decreased strength;Decreased endurance; Impaired balance;Decreased mobility;Pain;Orthopedic restrictions   Assessment Patient tolerated session well  He reports pain in his RLE, L knee and low back  He participated in supine there-ex with AAROM at times  Pain limited there-ex  He was able to perform bed mobility and a SPT to a w/c  Verbal cues were provided throughout for technique for to maintain NWB when in stance  He has good UE strength for maintaining NWB  He continues to present with decreased endurance, strength, balance and mobility  He will continue to benefit from P T  During hospital stay and will need rehab at discharge  Barriers to Discharge Inaccessible home environment;Decreased caregiver support   Barriers to Discharge Comments Steps to enter   Goals   Patient Goals "To get stronger "   STG Expiration Date 10/08/18   Treatment Day 2   Plan   Treatment/Interventions Functional transfer training; Therapeutic exercise; Endurance training;Bed mobility;Gait training;Spoke to nursing   Progress Progressing toward goals   PT Frequency Other (Comment)  (3-6x/wk)   Recommendation   Recommendation Post acute IP rehab   Equipment Recommended Wheelchair   Additional Comments OOB in chair with all needs met  Mother present during session  Jean Meredith, PT            Patient Name: aR Weaver  HZSRO'H Date: 10/1/2018
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
## 2018-10-01 NOTE — PROGRESS NOTES
Progress Note - Obi [place]amba 1990, 33 y o  male MRN: [id number]    Unit/Bed#: [facility] 919-01 Encounter: [id number]    Primary Care Provider: TOI Ortiz   Date and time admitted to hospital: 9/25/2018  4:20 PM        Left foot pain   Assessment & Plan    -tenderness palpation to the dorsal aspect of the left foot  -plain film negative  -neurovascularly intact     Acute blood loss anemia   Assessment & Plan    -continue to monitor  -vitals stable     Splenic laceration   Assessment & Plan    - Hemoglobins have been slowly down trending  -today, hgb 9 4 from 9 8, but hemodynamically stable  - Continue to monitor  - Abdominal exam is stable     Right acetabular fracture (HCC)   Assessment & Plan    - Postop day 4 with Orthopedics status post repair  - Hgb 9 4  - neurovascular checks  - NWB RLE, LLE stand to transfer     * Multiple closed fractures of pelvis with unstable disruption of pelvic [place] (HCC)   Assessment & Plan    - Postop day 4 with Orthopedics status post repair  - neurovascular checks  - NWB RLE, LLE stand to transfer LLE           Subjective/Objective   Chief Complaint: "My left hip/leg hurts"    Subjective: Patient rested comfortably overnight, complaints of mild left hip and leg pain this AM        Meds/Allergies   No prescriptions prior to admission  Vitals: Blood pressure 123/59, pulse 99, temperature 99 5 °F (37 5 °C), temperature source Oral, resp  rate 18, height 5' 8" (1 727 m), weight 95 3 kg (210 lb), SpO2 99 %  Body mass index is 31 93 kg/m²   SpO2: SpO2: 99 %    ABG: No results found for: PHART, TTI2VWO, PO2ART, IML0YII, T4XKXBUT, BEART, SOURCE      Intake/Output Summary (Last 24 hours) at 10/01/18 0722  Last data filed at 10/01/18 0555   Gross per 24 hour   Intake             1320 ml   Output              400 ml   Net              920 ml       Invasive Devices     Peripheral Intravenous Line            Peripheral IV 09/29/18 Left;Ventral (anterior) Forearm 2 days Nutrition/GI Proph/Bowel Reg: sennakot/colace, regular diet     Physical Exam:   GENERAL APPEARANCE: WNWD, NAD  HEENT: PERRLA, EOM intact   CV: RRR no m/r/g   LUNGS: CTAB  ABD: soft, non-tender, non-distended   EXT: Warm, dry  NEURO: AAOx4  SKIN: no rashes/lesions     Lab Results: Results: I have personally reviewed pertinent reports  and I have personally reviewed pertinent films in PACS    VTE Prophylaxis: Lovenox       Bedside rounds completed with patient and RN Soham Funk

## 2018-10-02 ENCOUNTER — TRANSITIONAL CARE MANAGEMENT (OUTPATIENT)
Dept: INTERNAL MEDICINE CLINIC | Facility: CLINIC | Age: 28
End: 2018-10-02

## 2018-10-02 PROCEDURE — TCMNV

## 2018-10-09 ENCOUNTER — OFFICE VISIT (OUTPATIENT)
Dept: OBGYN CLINIC | Facility: HOSPITAL | Age: 28
End: 2018-10-09

## 2018-10-09 ENCOUNTER — HOSPITAL ENCOUNTER (OUTPATIENT)
Dept: RADIOLOGY | Facility: HOSPITAL | Age: 28
Discharge: HOME | End: 2018-10-09
Attending: ORTHOPAEDIC SURGERY
Payer: COMMERCIAL

## 2018-10-09 VITALS
WEIGHT: 210 LBS | BODY MASS INDEX: 31.83 KG/M2 | HEART RATE: 80 BPM | HEIGHT: 68 IN | DIASTOLIC BLOOD PRESSURE: 72 MMHG | SYSTOLIC BLOOD PRESSURE: 119 MMHG

## 2018-10-09 DIAGNOSIS — S32.811A MULTIPLE CLOSED FRACTURES OF PELVIS WITH UNSTABLE DISRUPTION OF PELVIC RING, INITIAL ENCOUNTER (HCC): ICD-10-CM

## 2018-10-09 DIAGNOSIS — M25.562 ACUTE PAIN OF LEFT KNEE: ICD-10-CM

## 2018-10-09 DIAGNOSIS — Z48.89 AFTERCARE FOLLOWING SURGERY: Primary | ICD-10-CM

## 2018-10-09 DIAGNOSIS — S32.401D CLOSED DISPLACED FRACTURE OF RIGHT ACETABULUM WITH ROUTINE HEALING, UNSPECIFIED PORTION OF ACETABULUM, SUBSEQUENT ENCOUNTER: ICD-10-CM

## 2018-10-09 DIAGNOSIS — Z48.89 AFTERCARE FOLLOWING SURGERY: ICD-10-CM

## 2018-10-09 PROCEDURE — 73560 X-RAY EXAM OF KNEE 1 OR 2: CPT

## 2018-10-09 PROCEDURE — 99024 POSTOP FOLLOW-UP VISIT: CPT | Performed by: ORTHOPAEDIC SURGERY

## 2018-10-09 PROCEDURE — 72170 X-RAY EXAM OF PELVIS: CPT
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
## 2018-10-10 ENCOUNTER — TELEPHONE (OUTPATIENT)
Dept: OBGYN CLINIC | Facility: HOSPITAL | Age: 28
End: 2018-10-10

## 2018-10-10 NOTE — TELEPHONE ENCOUNTER
Physical therapy for the left lower extremity can consist of standing bed-to-chair transfer on the left lower extremity, he can also engage in knee and ankle motion and strengthening activities  Please call patient and inform of above    Thank you

## 2018-10-10 NOTE — TELEPHONE ENCOUNTER
OT was advised but is requesting an order to be faxed over to number provided  Please let me know and I will fax it over       Thanks

## 2018-10-10 NOTE — TELEPHONE ENCOUNTER
Porsche Cary is calling from the patients occupational therapy wanting clarification on the patients weightbearing status  She understands the weightbearing on the injured leg (right), but they are wondering if there was any upgrade in the status of his left leg  They are wondering this because of transfers for the patient      [phone] fax for therapist    Elissa villa

## 2018-10-16 ENCOUNTER — OFFICE VISIT (OUTPATIENT)
Dept: SURGERY | Facility: CLINIC | Age: 28
End: 2018-10-16
Payer: COMMERCIAL

## 2018-10-16 ENCOUNTER — TELEPHONE (OUTPATIENT)
Dept: OBGYN CLINIC | Facility: HOSPITAL | Age: 28
End: 2018-10-16

## 2018-10-16 VITALS — TEMPERATURE: 98.5 F | DIASTOLIC BLOOD PRESSURE: 86 MMHG | SYSTOLIC BLOOD PRESSURE: 120 MMHG

## 2018-10-16 DIAGNOSIS — K66.1 RETROPERITONEAL HEMATOMA: ICD-10-CM

## 2018-10-16 DIAGNOSIS — S32.009S LUMBAR TRANSVERSE PROCESS FRACTURE, SEQUELA: ICD-10-CM

## 2018-10-16 DIAGNOSIS — S36.039D LACERATION OF SPLEEN, SUBSEQUENT ENCOUNTER: Primary | ICD-10-CM

## 2018-10-16 PROBLEM — K68.3 RETROPERITONEAL HEMATOMA: Status: ACTIVE | Noted: 2018-10-16

## 2018-10-16 PROCEDURE — 99213 OFFICE O/P EST LOW 20 MIN: CPT | Performed by: SURGERY
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
## 2018-10-16 NOTE — PATIENT INSTRUCTIONS
Please get CT scan with just IV contrast on 12/7/18      Liver or Spleen Laceration   WHAT YOU NEED TO KNOW:   What is a liver or spleen laceration? A liver or spleen laceration is a cut, tear, or puncture in your liver or spleen  These injuries may or may not happen at the same time  A liver or spleen laceration may be caused by a sports injury, car accident, fall, gunshot, or stab wound  What are the signs and symptoms of a liver or spleen laceration? You may have an open wound on your abdomen  You may have pain, swelling, or bruising in your abdomen  You may have pain in your left shoulder if you bleed from your spleen  If you bleed heavily from your liver or spleen, you may feel weak, dizzy, or faint  How is a liver or spleen laceration diagnosed? Your healthcare provider will examine you and ask about your symptoms  You may need any of the following:  · X-ray, ultrasound, CT, or MRI  pictures may show a hole, cut, or tear in your liver or spleen  Pictures may also show blood or fluid in your abdomen  You may be given contrast liquid to help the liver, spleen, and blood vessels show up better in the pictures  Tell the healthcare provider if you have ever had an allergic reaction to contrast liquid  Do not enter the MRI room with anything metal  Metal can cause serious injury  Tell the healthcare provider if you have any metal in or on your body  · Blood tests  check your blood cell levels and liver function  · A diagnostic laparoscopy  looks for damage to your liver or spleen and bleeding in your abdomen  During this procedure, small incisions are made in your abdomen  A small scope is inserted through these incisions  A scope is a flexible tube with a light and camera on the end  How is a liver or spleen laceration treated? You may be monitored in an intensive care unit (ICU)  Healthcare providers will check your abdomen for swelling or bruising every 1 to 2 hours   You may need an x-ray, ultrasound, or CT scan once a day to check for bleeding in your abdomen  You may also need any of the following:  · Medicines  may be given to treat pain and prevent infection  You may be given a tetanus shot  Tetanus is a severe infection caused by bacteria  Tell your healthcare provider if you have had the tetanus vaccine or a tetanus booster within the last 5 years  · A blood transfusion  may be given if you bleed heavily  · IV fluids  may be given to prevent dehydration and help your circulation  · A drain  may be placed to remove extra blood or fluid from your abdomen  · Embolization  is a procedure to stop bleeding from your liver or spleen  A liquid, coil, or gel is injected into a blood vessel  Ask your healthcare provider for more information on this procedure  · Surgery  may be needed to repair damage to your spleen or liver or stop bleeding  Your spleen may be removed if it is severely damaged  What can I do to care for myself after a spleen or liver laceration? · Rest as directed  Take a short walk 2 to 3 times each day  This may prevent blood clots and help you heal faster  Do not play contact sports such as football or soccer  These activities can increase your risk for bleeding from your liver or spleen  Do not drive until your healthcare provider says it is okay  Ask your healthcare provider when you can return to your regular activities and work or school  · Care for your wound as directed  Do not remove your bandage for 24 hours or as directed  When your healthcare provider says you can shower, carefully wash around the wound with soap and water  It is okay to let soap and water gently run over your wound  Do not scrub your wound  Dry the area and put on new, clean bandages as directed  Change your bandages when they get wet or dirty  Check your wound every day for signs of infection, such as redness, swelling, or pus   Do not take a bath or swim until your healthcare provider says it is okay  These actions may cause an infection  · Do not take aspirin or NSAIDs  These medicines may increase your risk for bleeding  Call 911 for any of the following:   · You feel lightheaded, short of breath, and have chest pain  · You cough up blood  · You have trouble breathing  When should I seek immediate care? · Your arm or leg feels warm, tender, and painful  It may look swollen and red  · Your skin or eyes are yellow  · Your abdomen is larger than normal, firm, and painful  · You look pale or feel weak, dizzy, or faint  · You have new or worsening pain  · Blood soaks through your bandage  · Your wound comes apart  · You are vomiting blood or material that looks like coffee grounds  · You have blood in your bowel movements  · You have pain in your left shoulder  When should I contact my healthcare provider? · You have a fever  · Your wound is red, swollen, or draining pus  · Your pain does not get better after you take your pain medicine  · You have nausea or are vomiting  · You have questions or concerns about your condition or care  CARE AGREEMENT:   You have the right to help plan your care  Learn about your health condition and how it may be treated  Discuss treatment options with your caregivers to decide what care you want to receive  You always have the right to refuse treatment  The above information is an  only  It is not intended as medical advice for individual conditions or treatments  Talk to your doctor, nurse or pharmacist before following any medical regimen to see if it is safe and effective for you  © 2017 [employer]  Information is for End User's use only and may not be sold, redistributed or otherwise used for commercial purposes  All illustrations and images included in CareNotes® are the copyrighted property of A D A M , Inc  or [employer] Calderon

## 2018-10-16 NOTE — ASSESSMENT & PLAN NOTE
-Stable on exam  -repeat CT on 12/07/2018  -follow-up on 12/11/2018  -no complaints of abdominal pain  -no nausea or vomiting  -tolerating a diet  -continues to work with ambulation at [facility]

## 2018-10-16 NOTE — TELEPHONE ENCOUNTER
Caller: [facility]  Call back number: [phone]  Patient's doctor: Dr Justus Anne    Physical therapy is wondering if they can do a trail with crutches, the patient lives in a split level home so his kitchen is up steps from his living room  They will not be able to send him home until he is using crutches   Please advise

## 2018-10-17 NOTE — TELEPHONE ENCOUNTER
Lily Edge from [facility] care called -- Pt is discharging this weekend and they want to know if the patient can stop the Lovenox as they are ambulating on the crutches  Call back Number is [phone]    Thanks

## 2018-10-17 NOTE — TELEPHONE ENCOUNTER
Message reviewed  This patient had major orthopedic surgery      As a rule major orthopedic surgery gets reported by DVT prophylaxis for at least 4 weeks    To my count it is not 4 weeks    Please have him continue DVT prophylaxis

## 2018-10-17 NOTE — TELEPHONE ENCOUNTER
[facility] nursing staff given above information and verbalized understanding  Last day for Lovenox will be 10/25/18

## 2018-10-26 ENCOUNTER — TELEPHONE (OUTPATIENT)
Dept: INTERNAL MEDICINE CLINIC | Facility: CLINIC | Age: 28
End: 2018-10-26

## 2018-10-26 NOTE — TELEPHONE ENCOUNTER
JUANCARLOS     PATIENT BP     BEFORE PHYSICAL THERAPY LEFT ARM, 150/104     AFTER PHYSICAL THERAPY LEFT /100    BEFORE PHYSICAL THERAPY RIGHT /102     AFTER PHYSICAL THERAPY RIGHT /104

## 2018-10-31 ENCOUNTER — TELEPHONE (OUTPATIENT)
Dept: INTERNAL MEDICINE CLINIC | Facility: CLINIC | Age: 28
End: 2018-10-31

## 2018-11-06 ENCOUNTER — HOSPITAL ENCOUNTER (OUTPATIENT)
Dept: RADIOLOGY | Facility: HOSPITAL | Age: 28
Discharge: HOME | End: 2018-11-06
Attending: ORTHOPAEDIC SURGERY
Payer: COMMERCIAL

## 2018-11-06 ENCOUNTER — OFFICE VISIT (OUTPATIENT)
Dept: OBGYN CLINIC | Facility: HOSPITAL | Age: 28
End: 2018-11-06

## 2018-11-06 VITALS
SYSTOLIC BLOOD PRESSURE: 138 MMHG | WEIGHT: 210 LBS | DIASTOLIC BLOOD PRESSURE: 75 MMHG | BODY MASS INDEX: 31.83 KG/M2 | HEIGHT: 68 IN | HEART RATE: 97 BPM

## 2018-11-06 DIAGNOSIS — M25.562 ACUTE PAIN OF LEFT KNEE: ICD-10-CM

## 2018-11-06 DIAGNOSIS — S32.401D CLOSED DISPLACED FRACTURE OF RIGHT ACETABULUM WITH ROUTINE HEALING, UNSPECIFIED PORTION OF ACETABULUM, SUBSEQUENT ENCOUNTER: ICD-10-CM

## 2018-11-06 DIAGNOSIS — G89.29 CHRONIC PAIN OF LEFT KNEE: ICD-10-CM

## 2018-11-06 DIAGNOSIS — M23.92 INTERNAL DERANGEMENT OF LEFT KNEE: ICD-10-CM

## 2018-11-06 DIAGNOSIS — Z48.89 AFTERCARE FOLLOWING SURGERY: ICD-10-CM

## 2018-11-06 DIAGNOSIS — Z48.89 AFTERCARE FOLLOWING SURGERY: Primary | ICD-10-CM

## 2018-11-06 DIAGNOSIS — M25.562 CHRONIC PAIN OF LEFT KNEE: ICD-10-CM

## 2018-11-06 PROCEDURE — 72170 X-RAY EXAM OF PELVIS: CPT

## 2018-11-06 PROCEDURE — 99024 POSTOP FOLLOW-UP VISIT: CPT | Performed by: ORTHOPAEDIC SURGERY
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
## 2018-11-07 ENCOUNTER — EVALUATION (OUTPATIENT)
Dept: PHYSICAL THERAPY | Facility: MEDICAL CENTER | Age: 28
End: 2018-11-07
Payer: COMMERCIAL

## 2018-11-07 DIAGNOSIS — S32.401D CLOSED DISPLACED FRACTURE OF RIGHT ACETABULUM WITH ROUTINE HEALING, UNSPECIFIED PORTION OF ACETABULUM, SUBSEQUENT ENCOUNTER: ICD-10-CM

## 2018-11-07 PROCEDURE — 97161 PT EVAL LOW COMPLEX 20 MIN: CPT | Performed by: PHYSICAL THERAPIST

## 2018-11-07 PROCEDURE — G8979 MOBILITY GOAL STATUS: HCPCS | Performed by: PHYSICAL THERAPIST

## 2018-11-07 PROCEDURE — G8978 MOBILITY CURRENT STATUS: HCPCS | Performed by: PHYSICAL THERAPIST
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
## 2018-11-09 ENCOUNTER — OFFICE VISIT (OUTPATIENT)
Dept: PHYSICAL THERAPY | Facility: MEDICAL CENTER | Age: 28
End: 2018-11-09
Payer: COMMERCIAL

## 2018-11-09 DIAGNOSIS — S32.401D CLOSED DISPLACED FRACTURE OF RIGHT ACETABULUM WITH ROUTINE HEALING, UNSPECIFIED PORTION OF ACETABULUM, SUBSEQUENT ENCOUNTER: Primary | ICD-10-CM

## 2018-11-09 PROCEDURE — 97110 THERAPEUTIC EXERCISES: CPT | Performed by: PHYSICAL THERAPIST

## 2018-11-09 PROCEDURE — 97140 MANUAL THERAPY 1/> REGIONS: CPT | Performed by: PHYSICAL THERAPIST

## 2018-11-09 NOTE — PROGRESS NOTES
Daily Note     Today's date: 2018  Patient name: Chaparro Swanson  : 1990  MRN: [id number]  Referring provider: Violet George MD  Dx:   Encounter Diagnosis     ICD-10-CM    1  Closed displaced fracture of right acetabulum with routine healing, unspecified portion of acetabulum, subsequent encounter S32 401D                   Subjective: Pt reported that the exercises have been going well overall and he hasn't had much pain  Objective: See treatment diary below  Precautions: L4-L5 fx and pelvic fx    Daily Treatment Diary     Manual              Hip ext/int rot stretching CB            Hamstring stretch CB            Hip flexor stretch CB                                          Exercise Diary              Bike  12'            Heel slides 3x10            Supine march  3x10 GTB            Clam shells 2x10 GTB            Abduction heel slides 2X10             Standing abd/ext 2x10 ea            Calf stretch 4x20"            Staggered stance weight shift  2x10 +  2x10 w/balance disk                                                                                                                                                                            Modalities                                                             Assessment: Tolerated treatment well  Patient would benefit from continued PT  Pt was able to perform all exercises with good form and minimal pain  Continue to strengthen as tolerated, along with progressing weightbearing throughout sessions  Plan: Continue per plan of care

## 2018-11-12 ENCOUNTER — OFFICE VISIT (OUTPATIENT)
Dept: PHYSICAL THERAPY | Facility: MEDICAL CENTER | Age: 28
End: 2018-11-12
Payer: COMMERCIAL

## 2018-11-12 DIAGNOSIS — S32.401D CLOSED DISPLACED FRACTURE OF RIGHT ACETABULUM WITH ROUTINE HEALING, UNSPECIFIED PORTION OF ACETABULUM, SUBSEQUENT ENCOUNTER: Primary | ICD-10-CM

## 2018-11-12 PROCEDURE — 97140 MANUAL THERAPY 1/> REGIONS: CPT | Performed by: PHYSICAL THERAPIST

## 2018-11-12 PROCEDURE — 97110 THERAPEUTIC EXERCISES: CPT | Performed by: PHYSICAL THERAPIST

## 2018-11-12 NOTE — PROGRESS NOTES
Daily Note     Today's date: 2018  Patient name: Iraida Snow  : 1990  MRN: [id number]  Referring provider: Delbert Castillo MD  Dx:   Encounter Diagnosis     ICD-10-CM    1  Closed displaced fracture of right acetabulum with routine healing, unspecified portion of acetabulum, subsequent encounter S32 401D                   Subjective: Pt reported that he felt good after the last session and he had a little pain last night, but its going well over all  Objective: See treatment diary below    Precautions: L4-L5 fx and pelvic fx    Daily Treatment Diary     Manual             Hip ext/int rot stretching CB CB           Hamstring stretch CB CB           Hip flexor stretch CB CB                                         Exercise Diary              Bike  12' 10'           Heel slides 3x10 3X10           Supine march  3x10 GTB 3x10 GTB           Clam shells 2x10 GTB 3x10 GTB           Abduction heel slides 2X10  3x10           Standing abd/ext 2x10 ea 3x10 ea           Calf stretch 4x20" 4x20"           Staggered stance weight shift  2x10 +  2x10 w/balance disk 3x10           TB press   1x10 ea BTB                                                                                                                                                              Modalities                                                             Assessment: Tolerated treatment well  Patient would benefit from continued PT      Plan: Continue per plan of care

## 2018-11-13 ENCOUNTER — HOSPITAL ENCOUNTER (OUTPATIENT)
Dept: MRI IMAGING | Facility: HOSPITAL | Age: 28
Discharge: HOME | End: 2018-11-13
Payer: COMMERCIAL

## 2018-11-13 ENCOUNTER — TELEPHONE (OUTPATIENT)
Dept: OBGYN CLINIC | Facility: HOSPITAL | Age: 28
End: 2018-11-13

## 2018-11-13 DIAGNOSIS — G89.29 CHRONIC PAIN OF LEFT KNEE: ICD-10-CM

## 2018-11-13 DIAGNOSIS — M25.562 CHRONIC PAIN OF LEFT KNEE: ICD-10-CM

## 2018-11-13 PROCEDURE — 73721 MRI JNT OF LWR EXTRE W/O DYE: CPT

## 2018-11-13 NOTE — TELEPHONE ENCOUNTER
Caller: patient  Callback# [phone]  Dr Edmar Choi          Patient called stated MRI wasn't authorization to have MRI done  Please call thanks

## 2018-11-14 ENCOUNTER — TELEPHONE (OUTPATIENT)
Dept: INTERNAL MEDICINE CLINIC | Facility: CLINIC | Age: 28
End: 2018-11-14

## 2018-11-14 ENCOUNTER — OFFICE VISIT (OUTPATIENT)
Dept: PHYSICAL THERAPY | Facility: MEDICAL CENTER | Age: 28
End: 2018-11-14
Payer: COMMERCIAL

## 2018-11-14 DIAGNOSIS — S32.401D CLOSED DISPLACED FRACTURE OF RIGHT ACETABULUM WITH ROUTINE HEALING, UNSPECIFIED PORTION OF ACETABULUM, SUBSEQUENT ENCOUNTER: Primary | ICD-10-CM

## 2018-11-14 PROCEDURE — 97110 THERAPEUTIC EXERCISES: CPT | Performed by: PHYSICAL THERAPIST

## 2018-11-14 PROCEDURE — 97140 MANUAL THERAPY 1/> REGIONS: CPT | Performed by: PHYSICAL THERAPIST

## 2018-11-14 PROCEDURE — 97010 HOT OR COLD PACKS THERAPY: CPT | Performed by: PHYSICAL THERAPIST

## 2018-11-14 NOTE — TELEPHONE ENCOUNTER
Please review ([address] ) form placed in the [facility]) team folder in the provider flow station  ([phone]) when form is complete  If the form requires a signature by a MD or DO, please review it with them and sign the form  Please place the form in the [facility]) team folder in the Clinical flow station at the [address] [facility] and reply to this task to the Clinical Pool

## 2018-11-14 NOTE — PROGRESS NOTES
Daily Note     Today's date: 2018  Patient name: Obi Feliciano  : 1990  MRN: [id number]  Referring provider: Ania Santos MD  Dx:   Encounter Diagnosis     ICD-10-CM    1  Closed displaced fracture of right acetabulum with routine healing, unspecified portion of acetabulum, subsequent encounter S32 401D                   Subjective: Pt reported that his hip has been feeling looser and his strength is coming back  He is still nervous for full weight bearing at this point  Objective: See treatment diary below    Precautions: L4-L5 fx and pelvic fx    Daily Treatment Diary     Manual            Hip ext/int rot stretching CB CB CB          Hamstring stretch CB CB CB          Hip flexor stretch CB CB CB                                        Exercise Diary              Bike  12' 10' 15'          Heel slides 3x10 3X10 3x10 DC           Supine march  3x10 GTB 3x10 GTB 3x10 GTB w/mini kick out          Clam shells 2x10 GTB 3x10 GTB 3x10 GTB DC          Abduction heel slides 2X10  3x10 3x10          Standing abd/ext 2x10 ea 3x10 ea NP          Calf stretch 4x20" 4x20" 4x20"          Staggered stance weight shift  2x10 +  2x10 w/balance disk 3x10 2x10          TB press   1x10 ea BTB 3x10 BTB          SL clamshellw/pb   3x10          SL reverse clam/w pb   3x10                                                                                                                                   Modalities              MHP    10'                                              Assessment: Tolerated treatment well  Patient would benefit from continued PT  Pt was able to perform all exercises with minimal pain and shows progression in strength  Continue to focus on multidirectional strengthening and increase WB with stability activity as tolerated  Plan: Continue per plan of care

## 2018-11-16 ENCOUNTER — APPOINTMENT (OUTPATIENT)
Dept: PHYSICAL THERAPY | Facility: MEDICAL CENTER | Age: 28
End: 2018-11-16
Payer: COMMERCIAL

## 2018-11-19 ENCOUNTER — OFFICE VISIT (OUTPATIENT)
Dept: PHYSICAL THERAPY | Facility: MEDICAL CENTER | Age: 28
End: 2018-11-19
Payer: COMMERCIAL

## 2018-11-19 DIAGNOSIS — S32.401D CLOSED DISPLACED FRACTURE OF RIGHT ACETABULUM WITH ROUTINE HEALING, UNSPECIFIED PORTION OF ACETABULUM, SUBSEQUENT ENCOUNTER: Primary | ICD-10-CM

## 2018-11-19 PROCEDURE — 97110 THERAPEUTIC EXERCISES: CPT | Performed by: PHYSICAL THERAPIST

## 2018-11-19 PROCEDURE — 97140 MANUAL THERAPY 1/> REGIONS: CPT | Performed by: PHYSICAL THERAPIST

## 2018-11-19 NOTE — PROGRESS NOTES
Daily Note     Today's date: 2018  Patient name: Yaneth Capps  : 1990  MRN: [id number]  Referring provider: Rayshawn Carias MD  Dx:   Encounter Diagnosis     ICD-10-CM    1  Closed displaced fracture of right acetabulum with routine healing, unspecified portion of acetabulum, subsequent encounter S32 401D                   Subjective: Pt reported that he is having minimal pain, but his hip has felt a little stiff the last couple days secondary to decreased activity  Objective: See treatment diary below    Precautions: L4-L5 fx and pelvic fx    Daily Treatment Diary     Manual           Hip ext/int rot stretching CB CB CB CB         Hamstring stretch CB CB CB CB         Hip flexor stretch CB CB CB CB                                       Exercise Diary              Bike  12' 10' 15' 15'         Supine march  3x10 GTB 3x10 GTB 3x10 GTB w/mini kick out 3X10 GTB         Abduction heel slides 2X10  3x10 3x10 3x10         Standing abd/ext 2x10 ea 3x10 ea NP 2x10 ea         Calf stretch 4x20" 4x20" 4x20" np         Staggered stance weight shift  2x10 +  2x10 w/balance disk 3x10 2x10 2x10         TB press   1x10 ea BTB 3x10 BTB 4x10 BTB         SL clamshellw/pb   3x10 3X10         SL reverse clam/w pb   3x10 3X10                                                                                                                                  Modalities              MHP    10'                                          Assessment: Tolerated treatment well  Patient would benefit from continued PT  Pt reported increased anterior hip pain with supine/standing abduction and standing extension exercises that was not present previously  Continue to strengthen and mobilize as tolerated  Continue to follow anterior pain and adjust reps as needed  Plan: Continue per plan of care

## 2018-11-20 ENCOUNTER — OFFICE VISIT (OUTPATIENT)
Dept: INTERNAL MEDICINE CLINIC | Facility: CLINIC | Age: 28
End: 2018-11-20
Payer: COMMERCIAL

## 2018-11-20 VITALS
BODY MASS INDEX: 33.61 KG/M2 | HEART RATE: 84 BPM | DIASTOLIC BLOOD PRESSURE: 70 MMHG | TEMPERATURE: 98.1 F | SYSTOLIC BLOOD PRESSURE: 136 MMHG | HEIGHT: 68 IN | WEIGHT: 221.78 LBS

## 2018-11-20 DIAGNOSIS — I10 HYPERTENSION, UNSPECIFIED TYPE: ICD-10-CM

## 2018-11-20 DIAGNOSIS — Z83.3 FAMILY HISTORY OF DIABETES MELLITUS IN MOTHER: ICD-10-CM

## 2018-11-20 DIAGNOSIS — E66.9 OBESITY (BMI 30.0-34.9): ICD-10-CM

## 2018-11-20 DIAGNOSIS — G47.39 SLEEP APNEA-LIKE BEHAVIOR: ICD-10-CM

## 2018-11-20 DIAGNOSIS — N46.9 INFERTILITY MALE: Primary | ICD-10-CM

## 2018-11-20 PROBLEM — E66.811 OBESITY (BMI 30.0-34.9): Status: ACTIVE | Noted: 2018-11-20

## 2018-11-20 PROCEDURE — 3075F SYST BP GE 130 - 139MM HG: CPT | Performed by: INTERNAL MEDICINE

## 2018-11-20 PROCEDURE — 99214 OFFICE O/P EST MOD 30 MIN: CPT | Performed by: INTERNAL MEDICINE

## 2018-11-20 PROCEDURE — 3078F DIAST BP <80 MM HG: CPT | Performed by: INTERNAL MEDICINE
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
## 2018-11-20 NOTE — PATIENT INSTRUCTIONS
Hypertension   WHAT YOU NEED TO KNOW:   What is hypertension? Hypertension is high blood pressure (BP)  Your BP is the force of your blood moving against the walls of your arteries  Normal BP is less than 120/80  Prehypertension is between 120/80 and 139/89  Hypertension is 140/90 or higher  Hypertension causes your BP to get so high that your heart has to work much harder than normal  This can damage your heart  You can control hypertension with a healthy lifestyle or medicines  A controlled blood pressure helps protect your organs, such as your heart, lungs, brain, and kidneys  What causes hypertension? The cause of hypertension may not be known  This type of hypertension is called essential or primary hypertension  Hypertension can sometimes be caused by other medical conditions, such as kidney disease  This type of hypertension is called secondary hypertension  What increases my risk for hypertension? · Age older than 54 years (men)    · Age older than 72 years (women)    · A family history of hypertension or heart disease     · Obesity or lack of exercise     · Too many high-sodium foods    · Stress     · Use of tobacco, alcohol, or illegal drugs     · A medical condition, such as diabetes, kidney disease, thyroid disease, or adrenal gland disorder     · Certain medicines, such as steroids or birth control pills  What are the signs and symptoms of hypertension? You may have no signs or symptoms, or you may have any of the following:  · Headache     · Blurred vision     · Chest pain     · Dizziness or weakness     · Trouble breathing    · Nosebleeds  How is hypertension diagnosed? Your healthcare provider will ask about your symptoms and the medicines you take  He or she will also ask if you have a family history of high blood pressure and about any health conditions you have  He or she will also check your blood pressure and weight and examine your heart, lungs, and eyes   You may need any of the following tests:  · Blood tests  may help healthcare providers find the cause of your hypertension  Blood tests can also help find other health problems caused by hypertension  · Urine tests  will be done to check your kidney function  Kidney problems can increase your risk for hypertension  How is hypertension treated? Your healthcare provider will recommend lifestyle changes to lower your BP  You may also need the following medicines:  · Medicine  may be used to help lower your BP  You may need more than one type of medicine  Take the medicine exactly as directed  · Diuretics  help decrease extra fluid that collects in your body  This will help lower your BP  You may urinate more often while you take this medicine  · Cholesterol medicine  helps lower your cholesterol level  A low cholesterol level helps prevent heart disease and makes it easier to control your blood pressure  What can I do to manage hypertension? Talk with your healthcare provider about these and other ways to manage hypertension:  · Check your BP at home  Sit and rest for 5 minutes before you take your BP  Extend your arm and support it on a flat surface  Your arm should be at the same level as your heart  Follow the directions that came with your BP monitor  If possible, take at least 2 BP readings each time  Take your BP at least twice a day at the same times each day, such as morning and evening  Keep a record of your BP readings and bring it to your follow-up visits  Ask your healthcare provider what your BP should be  · Limit sodium (salt) as directed  Too much sodium can affect your fluid balance  Check labels to find low-sodium or no-salt-added foods  Some low-sodium foods use potassium salts for flavor  Too much potassium can also cause health problems  Your healthcare provider will tell you how much sodium and potassium are safe for you to have in a day   He or she may recommend that you limit sodium to 2,300 mg a day            · Follow the meal plan recommended by your healthcare provider  A dietitian or your provider can give you more information on low-sodium plans or the DASH (Dietary Approaches to Stop Hypertension) eating plan  The DASH plan is low in sodium, unhealthy fats, and total fat  It is high in potassium, calcium, and fiber  · Exercise to maintain a healthy weight  Exercise at least 30 minutes per day, on most days of the week  This will help decrease your blood pressure  Ask your healthcare provider about the best exercise plan for you  · Decrease stress  This may help lower your BP  Learn ways to relax, such as deep breathing or listening to music  · Limit alcohol  Women should limit alcohol to 1 drink a day  Men should limit alcohol to 2 drinks a day  A drink of alcohol is 12 ounces of beer, 5 ounces of wine, or 1½ ounces of liquor  · Do not smoke  Nicotine and other chemicals in cigarettes and cigars can increase your BP and also cause lung damage  Ask your healthcare provider for information if you currently smoke and need help to quit  E-cigarettes or smokeless tobacco still contain nicotine  Talk to your healthcare provider before you use these products  · Manage any other health conditions you have  Health conditions such as diabetes can increase your risk for hypertension  Follow your healthcare provider's instructions and take all your medicines as directed  Call 911 for any of the following:   · You have discomfort in your chest that feels like squeezing, pressure, fullness, or pain  · You become confused or have difficulty speaking  · You suddenly feel lightheaded or have trouble breathing  · You have pain or discomfort in your back, neck, jaw, stomach, or arm  When should I seek immediate care? · You have a severe headache or vision loss  · You have weakness in an arm or leg  When should I contact my healthcare provider?    · You feel faint, dizzy, confused, or drowsy  · You have been taking your BP medicine and your BP is still higher than your healthcare provider says it should be  · You have questions or concerns about your condition or care  CARE AGREEMENT:   You have the right to help plan your care  Learn about your health condition and how it may be treated  Discuss treatment options with your caregivers to decide what care you want to receive  You always have the right to refuse treatment  The above information is an  only  It is not intended as medical advice for individual conditions or treatments  Talk to your doctor, nurse or pharmacist before following any medical regimen to see if it is safe and effective for you  © 2017 [facility] Information is for End User's use only and may not be sold, redistributed or otherwise used for commercial purposes  All illustrations and images included in CareNotes® are the copyrighted property of A D A M [employer] Inc  or Akin Calderon

## 2018-11-21 ENCOUNTER — APPOINTMENT (OUTPATIENT)
Dept: LAB | Facility: CLINIC | Age: 28
End: 2018-11-21
Payer: COMMERCIAL

## 2018-11-21 ENCOUNTER — OFFICE VISIT (OUTPATIENT)
Dept: OBGYN CLINIC | Facility: HOSPITAL | Age: 28
End: 2018-11-21
Payer: COMMERCIAL

## 2018-11-21 ENCOUNTER — OFFICE VISIT (OUTPATIENT)
Dept: PHYSICAL THERAPY | Facility: MEDICAL CENTER | Age: 28
End: 2018-11-21
Payer: COMMERCIAL

## 2018-11-21 VITALS
HEART RATE: 75 BPM | DIASTOLIC BLOOD PRESSURE: 82 MMHG | BODY MASS INDEX: 33.75 KG/M2 | SYSTOLIC BLOOD PRESSURE: 149 MMHG | HEIGHT: 68 IN | WEIGHT: 222.66 LBS

## 2018-11-21 DIAGNOSIS — E66.9 OBESITY (BMI 30.0-34.9): ICD-10-CM

## 2018-11-21 DIAGNOSIS — Z83.3 FAMILY HISTORY OF DIABETES MELLITUS IN MOTHER: ICD-10-CM

## 2018-11-21 DIAGNOSIS — G47.9 SLEEP DISORDER: ICD-10-CM

## 2018-11-21 DIAGNOSIS — S86.912D STRAIN OF LEFT KNEE, SUBSEQUENT ENCOUNTER: Primary | ICD-10-CM

## 2018-11-21 DIAGNOSIS — E66.01 MORBID (SEVERE) OBESITY DUE TO EXCESS CALORIES (HCC): ICD-10-CM

## 2018-11-21 DIAGNOSIS — S32.401D CLOSED DISPLACED FRACTURE OF RIGHT ACETABULUM WITH ROUTINE HEALING, UNSPECIFIED PORTION OF ACETABULUM, SUBSEQUENT ENCOUNTER: Primary | ICD-10-CM

## 2018-11-21 LAB
ALBUMIN SERPL BCP-MCNC: 3.8 G/DL (ref 3.5–5)
ALP SERPL-CCNC: 94 U/L (ref 46–116)
ALT SERPL W P-5'-P-CCNC: 40 U/L (ref 12–78)
ANION GAP SERPL CALCULATED.3IONS-SCNC: 3 MMOL/L (ref 4–13)
AST SERPL W P-5'-P-CCNC: 19 U/L (ref 5–45)
BASOPHILS # BLD AUTO: 0.03 THOUSANDS/ΜL (ref 0–0.1)
BASOPHILS NFR BLD AUTO: 1 % (ref 0–1)
BILIRUB SERPL-MCNC: 0.38 MG/DL (ref 0.2–1)
BUN SERPL-MCNC: 9 MG/DL (ref 5–25)
CALCIUM SERPL-MCNC: 9.3 MG/DL (ref 8.3–10.1)
CHLORIDE SERPL-SCNC: 106 MMOL/L (ref 100–108)
CHOLEST SERPL-MCNC: 135 MG/DL (ref 50–200)
CO2 SERPL-SCNC: 29 MMOL/L (ref 21–32)
CREAT SERPL-MCNC: 0.88 MG/DL (ref 0.6–1.3)
EOSINOPHIL # BLD AUTO: 0.29 THOUSAND/ΜL (ref 0–0.61)
EOSINOPHIL NFR BLD AUTO: 6 % (ref 0–6)
ERYTHROCYTE [DISTWIDTH] IN BLOOD BY AUTOMATED COUNT: 13.8 % (ref 11.6–15.1)
EST. AVERAGE GLUCOSE BLD GHB EST-MCNC: 100 MG/DL
GFR SERPL CREATININE-BSD FRML MDRD: 116 ML/MIN/1.73SQ M
GLUCOSE P FAST SERPL-MCNC: 89 MG/DL (ref 65–99)
HBA1C MFR BLD: 5.1 % (ref 4.2–6.3)
HCT VFR BLD AUTO: 46.8 % (ref 36.5–49.3)
HDLC SERPL-MCNC: 53 MG/DL (ref 40–60)
HGB BLD-MCNC: 14.7 G/DL (ref 12–17)
IMM GRANULOCYTES # BLD AUTO: 0.01 THOUSAND/UL (ref 0–0.2)
IMM GRANULOCYTES NFR BLD AUTO: 0 % (ref 0–2)
LDLC SERPL CALC-MCNC: 68 MG/DL (ref 0–100)
LYMPHOCYTES # BLD AUTO: 2.47 THOUSANDS/ΜL (ref 0.6–4.47)
LYMPHOCYTES NFR BLD AUTO: 50 % (ref 14–44)
MCH RBC QN AUTO: 29.2 PG (ref 26.8–34.3)
MCHC RBC AUTO-ENTMCNC: 31.4 G/DL (ref 31.4–37.4)
MCV RBC AUTO: 93 FL (ref 82–98)
MONOCYTES # BLD AUTO: 0.36 THOUSAND/ΜL (ref 0.17–1.22)
MONOCYTES NFR BLD AUTO: 7 % (ref 4–12)
NEUTROPHILS # BLD AUTO: 1.79 THOUSANDS/ΜL (ref 1.85–7.62)
NEUTS SEG NFR BLD AUTO: 36 % (ref 43–75)
NRBC BLD AUTO-RTO: 0 /100 WBCS
PLATELET # BLD AUTO: 292 THOUSANDS/UL (ref 149–390)
PMV BLD AUTO: 11.2 FL (ref 8.9–12.7)
POTASSIUM SERPL-SCNC: 4 MMOL/L (ref 3.5–5.3)
PROT SERPL-MCNC: 7.6 G/DL (ref 6.4–8.2)
RBC # BLD AUTO: 5.03 MILLION/UL (ref 3.88–5.62)
SODIUM SERPL-SCNC: 138 MMOL/L (ref 136–145)
TRIGL SERPL-MCNC: 68 MG/DL
TSH SERPL DL<=0.05 MIU/L-ACNC: 1.24 UIU/ML (ref 0.36–3.74)
WBC # BLD AUTO: 4.95 THOUSAND/UL (ref 4.31–10.16)

## 2018-11-21 PROCEDURE — 36415 COLL VENOUS BLD VENIPUNCTURE: CPT

## 2018-11-21 PROCEDURE — 83036 HEMOGLOBIN GLYCOSYLATED A1C: CPT

## 2018-11-21 PROCEDURE — G8979 MOBILITY GOAL STATUS: HCPCS

## 2018-11-21 PROCEDURE — 97140 MANUAL THERAPY 1/> REGIONS: CPT

## 2018-11-21 PROCEDURE — 97110 THERAPEUTIC EXERCISES: CPT

## 2018-11-21 PROCEDURE — 99213 OFFICE O/P EST LOW 20 MIN: CPT | Performed by: ORTHOPAEDIC SURGERY

## 2018-11-21 PROCEDURE — 80053 COMPREHEN METABOLIC PANEL: CPT

## 2018-11-21 PROCEDURE — G8978 MOBILITY CURRENT STATUS: HCPCS

## 2018-11-21 PROCEDURE — 84443 ASSAY THYROID STIM HORMONE: CPT

## 2018-11-21 PROCEDURE — 85025 COMPLETE CBC W/AUTO DIFF WBC: CPT

## 2018-11-21 PROCEDURE — 80061 LIPID PANEL: CPT

## 2018-11-21 NOTE — PROGRESS NOTES
Daily Note     Today's date: 2018  Patient name: Estevan Meng  : 1990  MRN: [id number]  Referring provider: Zulay Krishnamurthy MD  Dx:   Encounter Diagnosis     ICD-10-CM    1  Closed displaced fracture of right acetabulum with routine healing, unspecified portion of acetabulum, subsequent encounter S32 401D                   Subjective: Pt reports that he is doing well today having no complaints of pain  Objective: See treatment diary below    Precautions: L4-L5 fx and pelvic fx    Daily Treatment Diary     Manual          Hip ext/int rot stretching CB CB CB CB MM        Hamstring stretch CB CB CB CB MM        Hip flexor stretch CB CB CB CB MM                                      Exercise Diary              Bike  12' 10' 15' 15' 15'        Supine march  3x10 GTB 3x10 GTB 3x10 GTB w/mini kick out 3X10 GTB 3x10 GTB        Abduction heel slides 2X10  3x10 3x10 3x10 3x10        Standing abd/ext 2x10 ea 3x10 ea NP 2x10 ea 2x10 ea        Calf stretch 4x20" 4x20" 4x20" np np        Staggered stance weight shift  2x10 +  2x10 w/balance disk 3x10 2x10 2x10 2x10        TB press   1x10 ea BTB 3x10 BTB 4x10 BTB 4x10 BTB        SL clamshellw/pb   3x10 3X10 3x10        SL reverse clam/w pb   3x10 3X10 3x10                                                                                                                                 Modalities              MHP    10'                                          Assessment: Tolerated treatment well  Patient would benefit from continued PT  Pt continues to be limited with hip mobility noting increased motion and relief post manuals  Continued with all other exercises this visit with less rest breaks in between  Plan: Continue per plan of care

## 2018-11-21 NOTE — PROGRESS NOTES
Assessment:  1  Strain of left knee, subsequent encounter  Ambulatory referral to Physical Therapy       Plan:    Mri results were reviewed and discussed in detail with patient  Physical therapy for the left knee was added to his current regimen of physical therapy  Activity to tolerance  May take NSAIDs for pain control as needed  To do next visit:  Return in about 4 weeks (around 12/19/2018) for Recheck left knee       The above stated was discussed in layman's terms and the patient expressed understanding  All questions were answered to the patient's satisfaction  Scribe Attestation    I,:   Jameel Yates am acting as a scribe while in the presence of the attending physician :        I,:   Charlee Rios MD personally performed the services described in this documentation    as scribed in my presence :              Subjective:   Amirah Dodson is a 33 y o  male who presents in the office for follow up on left knee pain and mri results  He has been non weight bearing with crutch assistance  He states no pain with resting and no pain with non weight bearing  He states numbness lateral aspect of the left upper leg, and states mild pain of the left knee when leg is extended  Review of systems negative unless otherwise specified in HPI    History reviewed  No pertinent past medical history  Past Surgical History:   Procedure Laterality Date    CIRCUMCISION      Elective    ORIF PELVIS Bilateral 9/27/2018    Procedure: OPEN REDUCTION W/ INTERNAL FIXATION (ORIF) PELVIS ANTERIOR/POSTERIOR APPROACH, ORIF PUBIC SYMPHYSIS;  Surgeon: Charlee Rios MD;  Location:  MAIN OR;  Service: Orthopedics       Family History   Problem Relation Age of Onset    Diabetes Mother        Social History     Occupational History    Not on file       Social History Main Topics    Smoking status: Current Every Day Smoker     Packs/day: 0 25     Years: 15 00    Smokeless tobacco: Never Used      Comment: pack per week     Alcohol use No    Drug use: Yes     Frequency: 1 0 time per week     Types: Marijuana    Sexual activity: Yes         Current Outpatient Prescriptions:     enoxaparin (LOVENOX) 40 mg/0 4 mL, Inject 0 4 mL (40 mg total) under the skin every 24 hours for 24 days, Disp: 9 6 mL, Rfl: 0    enoxaparin (LOVENOX) 60 mg/0 6 mL, , Disp: , Rfl:     methocarbamol (ROBAXIN) 500 mg tablet, Take 1 tablet (500 mg total) by mouth every 6 (six) hours as needed for muscle spasms (Patient not taking: Reported on 11/20/2018 ), Disp: 30 tablet, Rfl: 0    nicotine (NICODERM CQ) 14 mg/24hr TD 24 hr patch, Place 1 patch on the skin daily (Patient not taking: Reported on 10/16/2018 ), Disp: 28 patch, Rfl: 0    senna (SENOKOT) 8 6 mg, Take 2 tablets (17 2 mg total) by mouth daily at bedtime (Patient not taking: Reported on 10/16/2018 ), Disp: 120 each, Rfl: 0    No Known Allergies         Vitals:    11/21/18 1520   BP: 149/82   Pulse: 75       Objective:          Physical Exam                    Left Knee Exam     Tenderness   The patient is experiencing tenderness in the medial joint line and MCL  Range of Motion   Extension: 0 (pain with extension)   Flexion: 110     Tests   Masha:  Medial - negative Lateral - negative  Lachman:  Anterior - negative    Posterior - negative  Varus: negative  Valgus: negative    Other   Erythema: absent  Sensation: normal  Pulse: present  Swelling: none  Effusion: effusion present            Diagnostics, reviewed and taken today if performed as documented: The attending physician has personally reviewed the pertinent films in PACS and interpretation is as follows: MRI left knee partial tear of the left MCL, contusion of the lateral femoral condyle, medial and lateral tibial plateau and medial fibular head        Procedures, if performed today:    Procedures    None performed    Scribe Attestation    I,:   Ramo Lyon am acting as a scribe while in the presence of the attending physician :        I,:   Pilar Sosa MD personally performed the services described in this documentation    as scribed in my presence :          Portions of the record may have been created with voice recognition software   Occasional wrong word or "sound a like" substitutions may have occurred due to the inherent limitations of voice recognition software   Read the chart carefully and recognize, using context, where substitutions have occurred

## 2018-11-23 ENCOUNTER — APPOINTMENT (OUTPATIENT)
Dept: PHYSICAL THERAPY | Facility: MEDICAL CENTER | Age: 28
End: 2018-11-23
Payer: COMMERCIAL

## 2018-11-26 ENCOUNTER — OFFICE VISIT (OUTPATIENT)
Dept: PHYSICAL THERAPY | Facility: MEDICAL CENTER | Age: 28
End: 2018-11-26
Payer: COMMERCIAL

## 2018-11-26 DIAGNOSIS — S32.401D CLOSED DISPLACED FRACTURE OF RIGHT ACETABULUM WITH ROUTINE HEALING, UNSPECIFIED PORTION OF ACETABULUM, SUBSEQUENT ENCOUNTER: Primary | ICD-10-CM

## 2018-11-26 PROCEDURE — 97110 THERAPEUTIC EXERCISES: CPT

## 2018-11-26 PROCEDURE — 97140 MANUAL THERAPY 1/> REGIONS: CPT

## 2018-11-26 NOTE — PROGRESS NOTES
Daily Note     Today's date: 2018  Patient name: Preeti Lucas  : 1990  MRN: [id number]  Referring provider: Lai Peres MD  Dx:   Encounter Diagnosis     ICD-10-CM    1  Closed displaced fracture of right acetabulum with routine healing, unspecified portion of acetabulum, subsequent encounter S32 401D        Start Time: 0915  Stop Time: 1015  Total time in clinic (min): 60 minutes    Subjective: Pt states that he's not using his crutches around the house  Patient continues to use crutches for long distances  Patient states, "Could I use a cane?"    Objective: See treatment diary below  Educated and trialed patient with SPC during ambulation and steps  Additionally, educated and trialed single crutch ambulation  ~50ft w/ SPC and ~50ft w/ single crutch  Precautions: L4-L5 fx and pelvic fx    Daily Treatment Diary     Manual         Hip ext/int rot stretching CB CB CB CB MM JM       Hamstring stretch CB CB CB CB MM JM       Hip flexor stretch CB CB CB CB MM JM                                   Exercise Diary              Bike  15' 10' 15' 15' 15' 15'       Supine march  3x10 GTB 3x10 GTB 3x10 GTB w/mini kick out 3X10 GTB 3x10 GTB 3x10  GTB       Abduction heel slides 2X10  3x10 3x10 3x10 3x10 np       Standing abd/ext 2x10 ea 3x10 ea NP 2x10 ea 2x10 ea 2x10 ea       Calf stretch 4x20" 4x20" 4x20" np np np       Staggered stance weight shift  2x10 +  2x10 w/balance disk 3x10 2x10 2x10 2x10 2x10       TB press   1x10 ea BTB 3x10 BTB 4x10 BTB 4x10 BTB 4x10  BTB       SL clamshellw/pb   3x10 3X10 3x10 2x15       SL reverse clam/w pb   3x10 3X10 3x10 2x15       Step up/down      4"  x15                                                                                                                 Modalities              [facility]    10'   10'                                   Assessment: Tolerated treatment well  Patient would benefit from continued PT   Patient demonstrated antalgic gait pattern with SPC and single crutch but does not experience pain  Plan: Continue per plan of care

## 2018-11-27 NOTE — PROGRESS NOTES
Called patient and has been reminded that he need an test to be completed ([facility])  No question at this moment

## 2018-11-28 ENCOUNTER — APPOINTMENT (OUTPATIENT)
Dept: LAB | Facility: HOSPITAL | Age: 28
End: 2018-11-28
Payer: COMMERCIAL

## 2018-11-28 ENCOUNTER — OFFICE VISIT (OUTPATIENT)
Dept: PHYSICAL THERAPY | Facility: MEDICAL CENTER | Age: 28
End: 2018-11-28
Payer: COMMERCIAL

## 2018-11-28 ENCOUNTER — TRANSCRIBE ORDERS (OUTPATIENT)
Dept: LAB | Facility: HOSPITAL | Age: 28
End: 2018-11-28

## 2018-11-28 DIAGNOSIS — N46.9 INFERTILITY MALE: ICD-10-CM

## 2018-11-28 DIAGNOSIS — S32.401D CLOSED DISPLACED FRACTURE OF RIGHT ACETABULUM WITH ROUTINE HEALING, UNSPECIFIED PORTION OF ACETABULUM, SUBSEQUENT ENCOUNTER: Primary | ICD-10-CM

## 2018-11-28 LAB
COLLECTION DATE & TIME: ABNORMAL
LIQUEFACTION TIME SMN: 30 MIN
PH SMN: 8.3 [PH] (ref 7.2–8.6)
SEX ABSTIN DURATION TIME PATIENT: ABNORMAL D
SPECIMEN VOL SMN: 3.5 ML (ref 1–5)
SPERM # SMN: 8.05 MILLION/EJACULATION (ref 40–1000)
SPERM AMORPHOUS HEAD NFR SMN: 18 %
SPERM MORPH PNL SMN: 8
SPERM MOTILE SMN QL MICRO: 55 %
SPERM MOTILITY SCORE SMN AUTO: 1 (ref 2–4)
SPERM PROG NFR SMN: 1 % (ref 2–4)
SPERM SMN: 0 %
SPERM SMN: 1 %
SPERM SMN: 16 %
SPERM SMN: 19 %
SPERM SMN: 2 %
SPERM SMN: 2.3 /ML (ref 20–999)
SPERM SMN: 3 %
SPERM SMN: 30 % (ref 50–100)
SPERM SMN: 5 %
SPERM SMN: 6 %
SPERM SMN: 70 % (ref 0–50)
VISC SMN: 2 CP (ref 3–4)
WBC SMN QL: 0 "HPF"

## 2018-11-28 PROCEDURE — 89320 SEMEN ANAL VOL/COUNT/MOT: CPT

## 2018-11-28 PROCEDURE — 97010 HOT OR COLD PACKS THERAPY: CPT

## 2018-11-28 PROCEDURE — 97140 MANUAL THERAPY 1/> REGIONS: CPT

## 2018-11-28 PROCEDURE — 97110 THERAPEUTIC EXERCISES: CPT

## 2018-11-28 NOTE — PROGRESS NOTES
Daily Note     Today's date: 2018  Patient name: Bryan Scott  : 1990  MRN: [id number]  Referring provider: Isidro Dailey MD  Dx:   Encounter Diagnosis     ICD-10-CM    1  Closed displaced fracture of right acetabulum with routine healing, unspecified portion of acetabulum, subsequent encounter S32 401D                   Subjective: Pt ambulated with an antalgic gait pattern with crutches today  Pt denies pain in R pelvis/ hip  Objective: See treatment diary below      Precautions: L4-L5 fx and pelvic fx    Daily Treatment Diary     Manual        Hip ext/int rot stretching CB CB CB CB MM JM SC      Hamstring stretch CB CB CB CB MM JM SC      Hip flexor stretch CB CB CB CB MM JM SC                                  Exercise Diary              Bike  15' 10' 15' 15' 15' 15' 15'      Supine march  3x10 GTB 3x10 GTB 3x10 GTB w/mini kick out 3X10 GTB 3x10 GTB 3x10  GTB 3x10 GTB      Abduction heel slides 2X10  3x10 3x10 3x10 3x10 np np      Standing abd/ext 2x10 ea 3x10 ea NP 2x10 ea 2x10 ea 2x10 ea 2x10 ea  Calf stretch 4x20" 4x20" 4x20" np np np np      Staggered stance weight shift  2x10 +  2x10 w/balance disk 3x10 2x10 2x10 2x10 2x10 2x12      TB press   1x10 ea BTB 3x10 BTB 4x10 BTB 4x10 BTB 4x10  BTB 4x10 BTB      SL clamshellw/pb   3x10 3X10 3x10 2x15 2x15      SL reverse clam/w pb   3x10 3X10 3x10 2x15 2x15      Step up/down      4"  x15 4" 20x                                                                                                                Modalities             MHP    10'   10' 10'                                  Assessment: Tolerated treatment well  Patient would benefit from continued PT  Patient reported that he feels good after stretching and MHP  Pt denies pain and challenge with exercises  Pt required increased cues to use AD the proper way (bilateral crutches)  Plan: Continue per plan of care

## 2018-11-30 ENCOUNTER — OFFICE VISIT (OUTPATIENT)
Dept: PHYSICAL THERAPY | Facility: MEDICAL CENTER | Age: 28
End: 2018-11-30
Payer: COMMERCIAL

## 2018-11-30 DIAGNOSIS — M25.562 ACUTE PAIN OF LEFT KNEE: ICD-10-CM

## 2018-11-30 DIAGNOSIS — S32.401D CLOSED DISPLACED FRACTURE OF RIGHT ACETABULUM WITH ROUTINE HEALING, UNSPECIFIED PORTION OF ACETABULUM, SUBSEQUENT ENCOUNTER: Primary | ICD-10-CM

## 2018-11-30 PROCEDURE — 97110 THERAPEUTIC EXERCISES: CPT | Performed by: PHYSICAL THERAPIST

## 2018-11-30 PROCEDURE — 97164 PT RE-EVAL EST PLAN CARE: CPT | Performed by: PHYSICAL THERAPIST
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
## 2018-12-03 ENCOUNTER — OFFICE VISIT (OUTPATIENT)
Dept: PHYSICAL THERAPY | Facility: MEDICAL CENTER | Age: 28
End: 2018-12-03
Payer: COMMERCIAL

## 2018-12-03 DIAGNOSIS — S32.401D CLOSED DISPLACED FRACTURE OF RIGHT ACETABULUM WITH ROUTINE HEALING, UNSPECIFIED PORTION OF ACETABULUM, SUBSEQUENT ENCOUNTER: Primary | ICD-10-CM

## 2018-12-03 DIAGNOSIS — M25.562 ACUTE PAIN OF LEFT KNEE: ICD-10-CM

## 2018-12-03 PROCEDURE — 97110 THERAPEUTIC EXERCISES: CPT | Performed by: PHYSICAL THERAPIST

## 2018-12-03 PROCEDURE — 97140 MANUAL THERAPY 1/> REGIONS: CPT | Performed by: PHYSICAL THERAPIST

## 2018-12-03 NOTE — PROGRESS NOTES
Daily Note     Today's date: 12/3/2018  Patient name: Hope Wick  : 1990  MRN: [id number]  Referring provider: Mirlande Will MD  Dx:   Encounter Diagnosis     ICD-10-CM    1  Closed displaced fracture of right acetabulum with routine healing, unspecified portion of acetabulum, subsequent encounter S32 401D    2  Acute pain of left knee M25 562                   Subjective: Pt reported that he has been slowly increasing his WB time on the right hip and his left knee hasn't been giving him too many issues recently  Objective: See treatment diary below    Precautions: L4-L5 fx and pelvic fx    Daily Treatment Diary     Manual  11/9 11/12 11/14 11/19 11/21 11/26 11/28 12/3     Hip ext/int rot stretching CB CB CB CB MM JM SC CB     Hamstring stretch CB CB CB CB MM JM SC CB     Hip flexor stretch CB CB CB CB MM JM SC CB     L hamstring STM        CB                    Exercise Diary              Bike  15' 10' 15' 15' 15' 15' 15' 15'       3x10 GTB 3x10 GTB 3x10 GTB w/mini kick out 3X10 GTB 3x10 GTB 3x10  GTB 3x10 GTB DC     Abduction heel slides 2X10  3x10 3x10 3x10 3x10 np np 3x10     Standing abd/ext 2x10 ea 3x10 ea NP 2x10 ea 2x10 ea 2x10 ea 2x10 ea    2x10 ea     Calf stretch 4x20" 4x20" 4x20" np np np np 4x20"     Staggered stance weight shift  2x10 +  2x10 w/balance disk 3x10 2x10 2x10 2x10 2x10 2x12 DC     TB press   1x10 ea BTB 3x10 BTB 4x10 BTB 4x10 BTB 4x10  BTB 4x10 BTB 2x10 PTB     SL clamshellw/pb   3x10 3X10 3x10 2x15 2x15 2x10     SL reverse clam/w pb   3x10 3X10 3x10 2x15 2x15 2x15     Step up/down      4"  x15 4" 20x 4" 20x     Left hamstring iso on table       HEP np     Ball toss        3' on foam     Balance board        2'     PB rollouts        3x10 L/forward                                                           Modalities             MHP    10'   10' 10'                                      Pelvic Fx   Primary movement diagnosis of hypomobility and decreased strength resulting in symptoms of hip pain and limiting his participation/performance in walking  Primary impairments include:  1) decreased right hip mobility-- addressed with mobilization and stretching  2) decreased LLE strength--> addressed with strengthening  3) decreased low back and pelvic motor control--> addressed with strengthening and NMRE    Left knee 11/30  Primary movement diagnosis of decreased strength and mobility of the left LE resulting in symptoms of left knee pain and limiting his participation/performance in sitting for longer periods of time  Primary impairments include:  1) decreased left hamstrings length and strength-- addressed with stretching and stretching  2) decreased gastroc length--> addressed with stretching  3) decreased quad length--> addressed with stretching    Assessment: Tolerated treatment well  Patient would benefit from continued PT  Pt reported decreased knee pain following L hamstring stretching/STM  Pt reported decreased LBP with walking following PB rollouts  Pt was able to perform balance activities without pain  Plan: Continue per plan of care

## 2018-12-05 ENCOUNTER — OFFICE VISIT (OUTPATIENT)
Dept: PHYSICAL THERAPY | Facility: MEDICAL CENTER | Age: 28
End: 2018-12-05
Payer: COMMERCIAL

## 2018-12-05 DIAGNOSIS — S32.401D CLOSED DISPLACED FRACTURE OF RIGHT ACETABULUM WITH ROUTINE HEALING, UNSPECIFIED PORTION OF ACETABULUM, SUBSEQUENT ENCOUNTER: Primary | ICD-10-CM

## 2018-12-05 DIAGNOSIS — M25.562 ACUTE PAIN OF LEFT KNEE: ICD-10-CM

## 2018-12-05 PROCEDURE — 97110 THERAPEUTIC EXERCISES: CPT | Performed by: PHYSICAL THERAPIST

## 2018-12-05 PROCEDURE — 97140 MANUAL THERAPY 1/> REGIONS: CPT | Performed by: PHYSICAL THERAPIST

## 2018-12-05 NOTE — PROGRESS NOTES
Daily Note     Today's date: 2018  Patient name: Iraida Snow  : 1990  MRN: [id number]  Referring provider: Delbert Castillo MD  Dx:   Encounter Diagnosis     ICD-10-CM    1  Closed displaced fracture of right acetabulum with routine healing, unspecified portion of acetabulum, subsequent encounter S32 401D    2  Acute pain of left knee M25 562                   Subjective: Carmen Cheema reports that he is doing well, notes he has been limping a little since stopping using crutches  Objective: See treatment diary below  Precautions: L4-L5 fx and pelvic fx    Daily Treatment Diary     Manual  11/9 11/12 11/14 11/19 11/21 11/26 11/28 12/3 12/5    Hip ext/int rot stretching CB CB CB CB MM JM SC CB AF    Hamstring stretch CB CB CB CB MM JM SC CB AF    Hip flexor stretch CB CB CB CB MM JM SC CB AF    L hamstring STM        CB AF                   Exercise Diary              Bike  15' 10' 15' 15' 15' 15' 15' 15' 15'      3x10 GTB 3x10 GTB 3x10 GTB w/mini kick out 3X10 GTB 3x10 GTB 3x10  GTB 3x10 GTB DC     Abduction heel slides 2X10  3x10 3x10 3x10 3x10 np np 3x10 DC    Standing abd/ext 2x10 ea 3x10 ea NP 2x10 ea 2x10 ea 2x10 ea 2x10 ea    2x10 ea 3X10    Calf stretch 4x20" 4x20" 4x20" np np np np 4x20" 4X20"    Staggered stance weight shift  2x10 +  2x10 w/balance disk 3x10 2x10 2x10 2x10 2x10 2x12 DC     TB press   1x10 ea BTB 3x10 BTB 4x10 BTB 4x10 BTB 4x10  BTB 4x10 BTB 2x10 PTB 2X10  PTB    SL clamshellw/pb   3x10 3X10 3x10 2x15 2x15 2x10 2X10    SL reverse clam/w pb   3x10 3X10 3x10 2x15 2x15 2x15 2X15    Step up/down      4"  x15 4" 20x 4" 20x 4"  20X    Left hamstring iso on table       HEP np     Ball toss        3' on foam 3'  foam    Balance board        2' 2'    PB rollouts        3x10 L/forward 3X10  L/forward                                                          Modalities             MHP    10'   10' 10'                                      Assessment: Tolerated treatment well  Patient showing improved strength in proximal musculature resulting in improved gait mechanics  Plan: Continue per plan of care

## 2018-12-07 ENCOUNTER — OFFICE VISIT (OUTPATIENT)
Dept: PHYSICAL THERAPY | Facility: MEDICAL CENTER | Age: 28
End: 2018-12-07
Payer: COMMERCIAL

## 2018-12-07 DIAGNOSIS — S32.401D CLOSED DISPLACED FRACTURE OF RIGHT ACETABULUM WITH ROUTINE HEALING, UNSPECIFIED PORTION OF ACETABULUM, SUBSEQUENT ENCOUNTER: Primary | ICD-10-CM

## 2018-12-07 PROCEDURE — 97110 THERAPEUTIC EXERCISES: CPT | Performed by: PHYSICAL THERAPIST

## 2018-12-07 PROCEDURE — 97140 MANUAL THERAPY 1/> REGIONS: CPT | Performed by: PHYSICAL THERAPIST

## 2018-12-07 NOTE — PROGRESS NOTES
Daily Note     Today's date: 2018  Patient name: Tessa Mccormick  : 1990  MRN: [id number]  Referring provider: Najma Vann MD  Dx:   Encounter Diagnosis     ICD-10-CM    1  Closed displaced fracture of right acetabulum with routine healing, unspecified portion of acetabulum, subsequent encounter S32 401D                   Subjective: Pt reported that his L knee has been feeling pretty good, along with his hip  Objective: See treatment diary below    Precautions: L4-L5 fx and pelvic fx    Daily Treatment Diary     Manual  11/9 11/12 11/14 11/19 11/21 11/26 11/28 12/3 12/5 12/7   Hip ext/int rot stretching CB CB CB CB MM JM SC CB AF CB   Hamstring stretch CB CB CB CB MM JM SC CB AF CB   Hip flexor stretch CB CB CB CB MM JM SC CB AF CB   L hamstring STM        CB AF CB                  Exercise Diary              Bike  15' 10' 15' 15' 15' 15' 15' 15' 15' 15'     3x10 GTB 3x10 GTB 3x10 GTB w/mini kick out 3X10 GTB 3x10 GTB 3x10  GTB 3x10 GTB DC     Abduction heel slides 2X10  3x10 3x10 3x10 3x10 np np 3x10 DC    Standing abd/ext 2x10 ea 3x10 ea NP 2x10 ea 2x10 ea 2x10 ea 2x10 ea    2x10 ea 3X10 3x10   Calf stretch 4x20" 4x20" 4x20" np np np np 4x20" 4X20" 4x20"   Staggered stance weight shift  2x10 +  2x10 w/balance disk 3x10 2x10 2x10 2x10 2x10 2x12 DC     TB press   1x10 ea BTB 3x10 BTB 4x10 BTB 4x10 BTB 4x10  BTB 4x10 BTB 2x10 PTB 2X10  PTB 4x10 PTB    SL clamshellw/pb   3x10 3X10 3x10 2x15 2x15 2x10 2X10 3x10   SL reverse clam/w pb   3x10 3X10 3x10 2x15 2x15 2x15 2X15 3x10 3#   Step up/down      4"  x15 4" 20x 4" 20x 4"  20X L3 2x10   Left hamstring iso on table       HEP np     Ball toss        3' on foam 3'  foam np   Balance board        2' 2' 2'   PB rollouts        3x10 L/forward 3X10  L/forward np   LP          90# 2x10   SL LP          35# 2x10                               Modalities             [facility]    10'   10' 10'                                         Assessment: Tolerated treatment well  Patient would benefit from continued PT  Pt was able to perform exercises without pain  SLS was attempted but caused a sharp pain in the leg  Continue to progress balance activity with the end goal of greater SLS tolerance  Plan: Continue per plan of care

## 2018-12-12 ENCOUNTER — OFFICE VISIT (OUTPATIENT)
Dept: PHYSICAL THERAPY | Facility: MEDICAL CENTER | Age: 28
End: 2018-12-12
Payer: COMMERCIAL

## 2018-12-12 ENCOUNTER — TRANSCRIBE ORDERS (OUTPATIENT)
Dept: ADMINISTRATIVE | Facility: HOSPITAL | Age: 28
End: 2018-12-12

## 2018-12-12 DIAGNOSIS — M25.562 ACUTE PAIN OF LEFT KNEE: ICD-10-CM

## 2018-12-12 DIAGNOSIS — S32.401D CLOSED DISPLACED FRACTURE OF RIGHT ACETABULUM WITH ROUTINE HEALING, UNSPECIFIED PORTION OF ACETABULUM, SUBSEQUENT ENCOUNTER: Primary | ICD-10-CM

## 2018-12-12 PROCEDURE — 97110 THERAPEUTIC EXERCISES: CPT

## 2018-12-12 PROCEDURE — 97140 MANUAL THERAPY 1/> REGIONS: CPT

## 2018-12-14 ENCOUNTER — OFFICE VISIT (OUTPATIENT)
Dept: PHYSICAL THERAPY | Facility: MEDICAL CENTER | Age: 28
End: 2018-12-14
Payer: COMMERCIAL

## 2018-12-14 DIAGNOSIS — S32.401D CLOSED DISPLACED FRACTURE OF RIGHT ACETABULUM WITH ROUTINE HEALING, UNSPECIFIED PORTION OF ACETABULUM, SUBSEQUENT ENCOUNTER: Primary | ICD-10-CM

## 2018-12-14 DIAGNOSIS — M25.562 ACUTE PAIN OF LEFT KNEE: ICD-10-CM

## 2018-12-14 PROCEDURE — 97110 THERAPEUTIC EXERCISES: CPT | Performed by: PHYSICAL THERAPIST

## 2018-12-14 PROCEDURE — 97140 MANUAL THERAPY 1/> REGIONS: CPT | Performed by: PHYSICAL THERAPIST

## 2018-12-14 NOTE — PROGRESS NOTES
Daily Note     Today's date: 2018  Patient name: Tammie Arndt  : 1990  MRN: [id number]  Referring provider: Hayden Robles MD  Dx:   Encounter Diagnosis     ICD-10-CM    1  Closed displaced fracture of right acetabulum with routine healing, unspecified portion of acetabulum, subsequent encounter S32 401D    2  Acute pain of left knee M25 562                   Subjective: Achilles Comes reports that he is doing well, continues to have difficulty putting his full weight on right leg  Objective: See treatment diary below  Precautions: L4-L5 fx and pelvic fx    Daily Treatment Diary     Manual              Hip lateral mobilizations AF            Thoracic/TLJ PA grade IV mobilizations AF            Prone hip ELLIOT mobilization on R  AF                                          Exercise Diary              Bike  20 min            SLR flexion on L 1#  3X10            Bridges with purple TB 3X10            TB presses with foot up on stool 3X10            PB squats  3X10            Leg press 100#  3X10            Leg press single leg 50#  3X10            Weight shifts  30            Hamstring stretch with streap 30 sec  X3                                                                                                                                                               Modalities                                                               Assessment: Tolerated treatment well  Patient with reduced hip range of motion on R, improved post mobilizatoins  Patient continues to have instantaneous pain when shifting full weight on R but resovles quickly  largely unchanged despite glute facilitation prior to loading  Will continue to progress as able      Plan: Continue per plan of care

## 2018-12-17 ENCOUNTER — HOSPITAL ENCOUNTER (OUTPATIENT)
Dept: CT IMAGING | Facility: HOSPITAL | Age: 28
Discharge: HOME | End: 2018-12-17
Payer: COMMERCIAL

## 2018-12-17 DIAGNOSIS — S36.039D LACERATION OF SPLEEN, SUBSEQUENT ENCOUNTER: ICD-10-CM

## 2018-12-17 PROCEDURE — 74177 CT ABD & PELVIS W/CONTRAST: CPT

## 2018-12-17 RX ADMIN — IOHEXOL 100 ML: 350 INJECTION, SOLUTION INTRAVENOUS at 09:48

## 2018-12-18 ENCOUNTER — HOSPITAL ENCOUNTER (OUTPATIENT)
Dept: RADIOLOGY | Facility: HOSPITAL | Age: 28
Discharge: HOME | End: 2018-12-18
Attending: ORTHOPAEDIC SURGERY
Payer: COMMERCIAL

## 2018-12-18 ENCOUNTER — OFFICE VISIT (OUTPATIENT)
Dept: OBGYN CLINIC | Facility: HOSPITAL | Age: 28
End: 2018-12-18

## 2018-12-18 VITALS
HEIGHT: 68 IN | HEART RATE: 88 BPM | BODY MASS INDEX: 33.65 KG/M2 | WEIGHT: 222 LBS | DIASTOLIC BLOOD PRESSURE: 97 MMHG | SYSTOLIC BLOOD PRESSURE: 151 MMHG

## 2018-12-18 DIAGNOSIS — S83.412D SPRAIN OF MEDIAL COLLATERAL LIGAMENT OF LEFT KNEE, SUBSEQUENT ENCOUNTER: ICD-10-CM

## 2018-12-18 DIAGNOSIS — Z98.890 HISTORY OF OPEN REDUCTION AND INTERNAL FIXATION (ORIF) PROCEDURE: Primary | ICD-10-CM

## 2018-12-18 DIAGNOSIS — Z98.890 HISTORY OF OPEN REDUCTION AND INTERNAL FIXATION (ORIF) PROCEDURE: ICD-10-CM

## 2018-12-18 PROBLEM — S83.412A SPRAIN OF MEDIAL COLLATERAL LIGAMENT OF LEFT KNEE: Status: ACTIVE | Noted: 2018-12-18

## 2018-12-18 PROCEDURE — 72170 X-RAY EXAM OF PELVIS: CPT

## 2018-12-18 PROCEDURE — 99024 POSTOP FOLLOW-UP VISIT: CPT | Performed by: ORTHOPAEDIC SURGERY

## 2018-12-18 RX ORDER — NABUMETONE 750 MG/1
750 TABLET, FILM COATED ORAL 2 TIMES DAILY
Qty: 60 TABLET | Refills: 1 | Status: SHIPPED | OUTPATIENT
Start: 2018-12-18

## 2018-12-18 NOTE — PROGRESS NOTES
Assessment:  1  History of open reduction and internal fixation (ORIF) procedure  XR pelvis ap only 1 or 2 vw    Ambulatory referral to Physical Therapy    nabumetone (RELAFEN) 750 mg tablet   2  Sprain of medial collateral ligament of left knee, subsequent encounter         Plan:  X-rays reviewed and physical exam performed  Healed left knee MCL sprain  Symptoms and physical physical exam were consistent with right SI joint inflammation, status post ORIF  Recommend continuation of physical therapy to focus on his right SI joint for stabilization, core and posture treatments  Weight-bearing and activities as tolerated  We will prescribe Relafen to take 4 and an oral anti-inflammatory and can take Tylenol as indicated if needed  To do next visit:  Return in about 6 weeks (around 1/29/2019) for re-check with x-rays of his pelvis  The above stated was discussed in layman's terms and the patient expressed understanding  All questions were answered to the patient's satisfaction  Scribe Attestation    I,:   Parisa Orellana am acting as a scribe while in the presence of the attending physician :        I,:   Roldan Chun MD personally performed the services described in this documentation    as scribed in my presence :              Subjective:   Rafaela Borrego is a 33 y o  male who presents repeat evaluation nearly 3 months status post ORIF  pubic symphysis and right SI joint  He is also here for repeat evaluation of his left knee MCL sprain  He has been attending physical therapy and progressing well  He notes varing discomfort and describes his symptoms as more of a soreness than pain  He has no symptoms at his left knee  He has been taking Tylenol for his right-sided low back discomfort  Denies any bowel or bladder issues denies any anterior pelvic discomfort  He finds himself limping due to right-sided low back pain        Review of systems negative unless otherwise specified in HPI    History reviewed  No pertinent past medical history  Past Surgical History:   Procedure Laterality Date    CIRCUMCISION      Elective    ORIF PELVIS Bilateral 9/27/2018    Procedure: OPEN REDUCTION W/ INTERNAL FIXATION (ORIF) PELVIS ANTERIOR/POSTERIOR APPROACH, ORIF PUBIC SYMPHYSIS;  Surgeon: Isaias Zhong MD;  Location: [facility];  Service: Orthopedics       Family History   Problem Relation Age of Onset    Diabetes Mother        Social History     Occupational History    Not on file  Social History Main Topics    Smoking status: Current Every Day Smoker     Packs/day: 0 25     Years: 15 00    Smokeless tobacco: Never Used      Comment: pack per week     Alcohol use No    Drug use: Yes     Frequency: 1 0 time per week     Types: Marijuana    Sexual activity: Yes         Current Outpatient Prescriptions:     enoxaparin (LOVENOX) 40 mg/0 4 mL, Inject 0 4 mL (40 mg total) under the skin every 24 hours for 24 days, Disp: 9 6 mL, Rfl: 0    enoxaparin (LOVENOX) 60 mg/0 6 mL, , Disp: , Rfl:     methocarbamol (ROBAXIN) 500 mg tablet, Take 1 tablet (500 mg total) by mouth every 6 (six) hours as needed for muscle spasms (Patient not taking: Reported on 11/20/2018 ), Disp: 30 tablet, Rfl: 0    nicotine (NICODERM CQ) 14 mg/24hr TD 24 hr patch, Place 1 patch on the skin daily (Patient not taking: Reported on 10/16/2018 ), Disp: 28 patch, Rfl: 0    senna (SENOKOT) 8 6 mg, Take 2 tablets (17 2 mg total) by mouth daily at bedtime (Patient not taking: Reported on 10/16/2018 ), Disp: 120 each, Rfl: 0    No Known Allergies         Vitals:    12/18/18 1547   BP: 151/97   Pulse: 88       Objective:                      Left Knee Exam     Tenderness   The patient is experiencing no tenderness  Range of Motion   The patient has normal left knee ROM  Muscle Strength     The patient has normal left knee strength      Tests   Varus: negative  Valgus: negative    Other   Erythema: absent  Sensation: normal  Swelling: none  Effusion: no effusion present      Right Hip Exam     Tenderness   Right hip tenderness location: Tenderness to palpation right SI joint  Muscle Strength   The patient has normal right hip strength  Tests   ELLIOT: positive (recreates his posterior leatha-pelvic pain)    Other   Erythema: absent  Sensation: normal    Comments:    Calf and thigh are soft and nontender without signs of DVT      Left Hip Exam     Tenderness   The patient is experiencing no tenderness  Range of Motion   The patient has normal left hip ROM  Muscle Strength   The patient has normal left hip strength  Diagnostics, reviewed and taken today if performed as documented: The attending physician has personally reviewed the pertinent films in PACS and interpretation is as follows:    AP pelvis x-ray taken and reviewed the office today show:  Pelvic and sacroiliac hardware in excellent position alignment  Healed fractures throughout the pelvis and right acetabulum  Callus formation present superior and inferior at the right SI joint area  Procedures, if performed today:    Procedures    None performed      Portions of the record may have been created with voice recognition software   Occasional wrong word or "sound a like" substitutions may have occurred due to the inherent limitations of voice recognition software   Read the chart carefully and recognize, using context, where substitutions have occurred

## 2018-12-19 ENCOUNTER — OFFICE VISIT (OUTPATIENT)
Dept: PHYSICAL THERAPY | Facility: MEDICAL CENTER | Age: 28
End: 2018-12-19
Payer: COMMERCIAL

## 2018-12-19 DIAGNOSIS — S32.401D CLOSED DISPLACED FRACTURE OF RIGHT ACETABULUM WITH ROUTINE HEALING, UNSPECIFIED PORTION OF ACETABULUM, SUBSEQUENT ENCOUNTER: ICD-10-CM

## 2018-12-19 DIAGNOSIS — M25.562 ACUTE PAIN OF LEFT KNEE: Primary | ICD-10-CM

## 2018-12-19 PROCEDURE — 97112 NEUROMUSCULAR REEDUCATION: CPT | Performed by: PHYSICAL THERAPIST

## 2018-12-19 PROCEDURE — 97110 THERAPEUTIC EXERCISES: CPT | Performed by: PHYSICAL THERAPIST

## 2018-12-19 NOTE — PROGRESS NOTES
Progress Note     Today's date: 2018  Patient name: Obi Feliciano  : 1990  MRN: [id number]  Referring provider: Ania Santos MD  Dx:   Encounter Diagnosis     ICD-10-CM    1  Acute pain of left knee M25 562    2  Closed displaced fracture of right acetabulum with routine healing, unspecified portion of acetabulum, subsequent encounter S32 401D                   Subjective: Nan Raygoza reports that he is improving slowly  He feels that his walking is improving but he continues to limp with any weight bearing on his right side  Had recent follow up with MD and lumbar and pelvic ORIF are healing well  Had R SIJ irritation and begun on anti-inflammatory by ortho  Objective: See treatment diary below  Precautions: L4-L5 fx and pelvic fx    Daily Treatment Diary     Manual             Hip lateral mobilizations AF NP           Thoracic/TLJ PA grade IV mobilizations AF AF           Prone hip ELLIOT mobilization on R  AF NP                                         Exercise Diary              Bike  20 min 15 min            SLR flexion on L 1#  3X10 1#  3X10           Bridges with purple TB 3X10 3X10           TB presses with foot up on stool 3X10 Tandem stance  20 each           PB squats  3X10 NP           Leg press 100#  3X10 NP           Leg press single leg 50#  3X10 NP           Weight shifts  30 30           Hamstring stretch with streap 30 sec  X3 30 sec  X3           Quad stretch on L   30 sec  X3           Mod planks on PB  20 sec  X3           Table plank with hip extension  2X10           Supine alternating clamshell  Purple  3X10                                                                                                          Modalities                                                         Assessment: Tolerated treatment well   Obi Feliciano has been compliant with attending PT and home exercise program since initial eval   Nanshalini Raygoza has shown improvements in objective impairments identified at initial evaluation which have resulted in decreased functional deficits  Josef Lee continues to respond favorably to treatment and will benefit from continued skilled PT services to address remaining impairments and functional limitations including walking, bending, prolonged postures, lifting  Patient will be transitioned to home exercise program as appropriate  Gait remains antalgic R   Poor dynamic hip control  Strength 4/5 L LE, 3+/5 R LE   Core control poor resulting in lumbopelvic irritation         Plan: Continue per plan of care     Plan 2-3X per week continuation of therapy

## 2018-12-21 ENCOUNTER — OFFICE VISIT (OUTPATIENT)
Dept: SURGERY | Facility: CLINIC | Age: 28
End: 2018-12-21
Payer: COMMERCIAL

## 2018-12-21 ENCOUNTER — OFFICE VISIT (OUTPATIENT)
Dept: PHYSICAL THERAPY | Facility: MEDICAL CENTER | Age: 28
End: 2018-12-21
Payer: COMMERCIAL

## 2018-12-21 VITALS
TEMPERATURE: 98.4 F | SYSTOLIC BLOOD PRESSURE: 150 MMHG | DIASTOLIC BLOOD PRESSURE: 82 MMHG | WEIGHT: 228 LBS | BODY MASS INDEX: 34.67 KG/M2

## 2018-12-21 DIAGNOSIS — S32.401D CLOSED DISPLACED FRACTURE OF RIGHT ACETABULUM WITH ROUTINE HEALING, UNSPECIFIED PORTION OF ACETABULUM, SUBSEQUENT ENCOUNTER: ICD-10-CM

## 2018-12-21 DIAGNOSIS — S36.039D LACERATION OF SPLEEN, SUBSEQUENT ENCOUNTER: ICD-10-CM

## 2018-12-21 DIAGNOSIS — M25.562 ACUTE PAIN OF LEFT KNEE: Primary | ICD-10-CM

## 2018-12-21 DIAGNOSIS — K66.1 RETROPERITONEAL HEMATOMA: Primary | ICD-10-CM

## 2018-12-21 PROCEDURE — 97110 THERAPEUTIC EXERCISES: CPT | Performed by: PHYSICAL THERAPIST

## 2018-12-21 PROCEDURE — 99213 OFFICE O/P EST LOW 20 MIN: CPT | Performed by: PHYSICIAN ASSISTANT

## 2018-12-21 RX ORDER — DIPHENOXYLATE HYDROCHLORIDE AND ATROPINE SULFATE 2.5; .025 MG/1; MG/1
1 TABLET ORAL DAILY
COMMUNITY

## 2018-12-21 NOTE — PROGRESS NOTES
Assessment/Plan:     Problem List Items Addressed This Visit     Splenic laceration     -repeat CT scan stable  -no further follow-up  -DC from Trauma service         Retroperitoneal hematoma - Primary     -no further follow-up  -CT scan stable  -DC from Trauma service               Disposition:  DC from Trauma service  Patient may follow up with Orthopedics  CT scan was stable  Patient offers no complaints  Call with questions  Subjective:  Patient offers no new complaints today on presentation  Reports he is feeling back to baseline  Denies any new pain  Denies any abdominal pain  Denies any nausea or vomiting  Denies any fevers, chills, sweats  Patient ID: Jed Catalan is a 33 y o  male      Patient here for follow-up status post retroperitoneal hematoma and splenic laceration        The following portions of the patient's history were reviewed and updated as appropriate: allergies, current medications, past family history, past medical history, past social history, past surgical history and problem list   No Known Allergies  Current Outpatient Prescriptions on File Prior to Visit   Medication Sig Dispense Refill    enoxaparin (LOVENOX) 40 mg/0 4 mL Inject 0 4 mL (40 mg total) under the skin every 24 hours for 24 days 9 6 mL 0    enoxaparin (LOVENOX) 60 mg/0 6 mL       methocarbamol (ROBAXIN) 500 mg tablet Take 1 tablet (500 mg total) by mouth every 6 (six) hours as needed for muscle spasms (Patient not taking: Reported on 11/20/2018 ) 30 tablet 0    nabumetone (RELAFEN) 750 mg tablet Take 1 tablet (750 mg total) by mouth 2 (two) times a day 60 tablet 1    nicotine (NICODERM CQ) 14 mg/24hr TD 24 hr patch Place 1 patch on the skin daily (Patient not taking: Reported on 10/16/2018 ) 28 patch 0    senna (SENOKOT) 8 6 mg Take 2 tablets (17 2 mg total) by mouth daily at bedtime (Patient not taking: Reported on 10/16/2018 ) 120 each 0     No current facility-administered medications on file prior to visit  Family History   Problem Relation Age of Onset    Diabetes Mother      No past medical history on file  Social History         Review of Systems   Constitutional: Negative  HENT: Negative  Eyes: Negative  Respiratory: Negative  Cardiovascular: Negative  Gastrointestinal: Negative  Genitourinary: Negative  Musculoskeletal: Negative  Skin: Negative  Neurological: Negative  Hematological: Negative  Objective:      /82 (BP Location: Left arm, Patient Position: Sitting)   Temp 98 4 °F (36 9 °C) (Tympanic)   Wt 103 kg (228 lb)   BMI 34 67 kg/m²          Physical Exam   Constitutional: He is oriented to person, place, and time  He appears well-developed and well-nourished  No distress  HENT:   Head: Normocephalic and atraumatic  Eyes: Pupils are equal, round, and reactive to light  Conjunctivae and EOM are normal    Neck: Normal range of motion  Neck supple  No tracheal deviation present  Cardiovascular: Normal rate, regular rhythm, normal heart sounds and intact distal pulses  Pulmonary/Chest: Effort normal and breath sounds normal  No respiratory distress  He has no wheezes  Abdominal: Soft  Bowel sounds are normal  He exhibits no distension  There is no tenderness  Musculoskeletal: Normal range of motion  Neurological: He is alert and oriented to person, place, and time  No cranial nerve deficit  Skin: Skin is warm and dry  He is not diaphoretic  No erythema  Vitals reviewed

## 2018-12-21 NOTE — PROGRESS NOTES
Daily Note     Today's date: 2018  Patient name: Analia Saldaña  : 1990  MRN: [id number]  Referring provider: Addie Bourgeois MD  Dx:   Encounter Diagnosis     ICD-10-CM    1  Acute pain of left knee M25 562    2  Closed displaced fracture of right acetabulum with routine healing, unspecified portion of acetabulum, subsequent encounter S32 401D                   Subjective: Lauren Taylor reports that he is walking better but still cannot walk longer distances  Objective: See treatment diary below  Precautions: L4-L5 fx and pelvic fx    Daily Treatment Diary     Manual            Hip lateral mobilizations AF NP           Thoracic/TLJ PA grade IV mobilizations AF AF AF          Prone hip ELLIOT mobilization on R  AF NP                                         Exercise Diary              Bike  20 min 15 min  15 min           SLR flexion on L 1#  3X10 1#  3X10 2#  3X10          Bridges with purple TB 3X10 3X10 3X10          TB presses with foot up on stool 3X10 Tandem stance  20 each tandem  30each          PB squats  3X10 NP 3X10          Leg press 100#  3X10 NP NP          Leg press single leg 50#  3X10 NP NP          Weight shifts  30 30 30          Hamstring stretch with streap 30 sec  X3 30 sec  X3 30 sec  X3          Quad stretch on L   30 sec  X3           Mod planks on PB  20 sec  X3 20 sec  X3          Table plank with hip extension  2X10 2X10          Supine alternating clamshell  Purple  3X10 Purple  3X10          SL clamshell   Green  3X10                                                                                            Modalities                                                         Assessment: Tolerated treatment well  Patient tolerates LE and lumbopelvic strengthening well  Some notes of L knee pain during squatting task  Continue to progress core stability as able      Plan: Continue per plan of care

## 2018-12-24 ENCOUNTER — OFFICE VISIT (OUTPATIENT)
Dept: PHYSICAL THERAPY | Facility: MEDICAL CENTER | Age: 28
End: 2018-12-24
Payer: COMMERCIAL

## 2018-12-24 DIAGNOSIS — M25.562 ACUTE PAIN OF LEFT KNEE: ICD-10-CM

## 2018-12-24 DIAGNOSIS — S32.401D CLOSED DISPLACED FRACTURE OF RIGHT ACETABULUM WITH ROUTINE HEALING, UNSPECIFIED PORTION OF ACETABULUM, SUBSEQUENT ENCOUNTER: Primary | ICD-10-CM

## 2018-12-24 PROCEDURE — 97110 THERAPEUTIC EXERCISES: CPT | Performed by: PHYSICAL THERAPIST

## 2018-12-24 NOTE — PROGRESS NOTES
Daily Note     Today's date: 2018  Patient name: Tammie Arndt  : 1990  MRN: [id number]  Referring provider: Hayden Robles MD  Dx:   Encounter Diagnosis     ICD-10-CM    1  Closed displaced fracture of right acetabulum with routine healing, unspecified portion of acetabulum, subsequent encounter S32 401D    2  Acute pain of left knee M25 562                   Subjective: Achilles Comes reports that he is doing well, notes that he has less pain when walking but still feels himself limping       Objective: See treatment diary below  Precautions: L4-L5 fx and pelvic fx    Daily Treatment Diary     Manual           Hip lateral mobilizations AF NP           Thoracic/TLJ PA grade IV mobilizations AF AF AF          Prone hip ELLIOT mobilization on R  AF NP                                         Exercise Diary              Bike  20 min 15 min  15 min  15 min          SLR flexion on L 1#  3X10 1#  3X10 2#  3X10 2#  3X10         Bridges with purple TB 3X10 3X10 3X10 3X10         TB presses with foot up on stool 3X10 Tandem stance  20 each tandem  30each Leg down  30 each         PB squats  3X10 NP 3X10 3X10         Leg press 100#  3X10 NP NP          Leg press single leg 50#  3X10 NP NP          Weight shifts  30 30 30          Hamstring stretch with streap 30 sec  X3 30 sec  X3 30 sec  X3 30 sec  X3         Quad stretch on L   30 sec  X3           Mod planks on PB  20 sec  X3 20 sec  X3 30 sec  X3         Table plank with hip extension  2X10 2X10 3X10         Supine alternating clamshell  Purple  3X10 Purple  3X10 purple  3X10         SL clamshell   Green  3X10 Green  3X10                                                                                           Modalities                                                         Assessment: Tolerated treatment well  Patient continue to improve lumbopelvic strength and gait pattern is improving    Remains with hip and core weakness that will benefit from further intervention      Plan: Continue per plan of care

## 2018-12-28 ENCOUNTER — OFFICE VISIT (OUTPATIENT)
Dept: PHYSICAL THERAPY | Facility: MEDICAL CENTER | Age: 28
End: 2018-12-28
Payer: COMMERCIAL

## 2018-12-28 DIAGNOSIS — S32.401D CLOSED DISPLACED FRACTURE OF RIGHT ACETABULUM WITH ROUTINE HEALING, UNSPECIFIED PORTION OF ACETABULUM, SUBSEQUENT ENCOUNTER: Primary | ICD-10-CM

## 2018-12-28 DIAGNOSIS — M25.562 ACUTE PAIN OF LEFT KNEE: ICD-10-CM

## 2018-12-28 PROCEDURE — 97110 THERAPEUTIC EXERCISES: CPT | Performed by: PHYSICAL THERAPIST

## 2018-12-28 PROCEDURE — 97164 PT RE-EVAL EST PLAN CARE: CPT | Performed by: PHYSICAL THERAPIST
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
## 2018-12-31 ENCOUNTER — APPOINTMENT (OUTPATIENT)
Dept: PHYSICAL THERAPY | Facility: MEDICAL CENTER | Age: 28
End: 2018-12-31
Payer: COMMERCIAL

## 2019-01-02 ENCOUNTER — OFFICE VISIT (OUTPATIENT)
Dept: PHYSICAL THERAPY | Facility: MEDICAL CENTER | Age: 29
End: 2019-01-02
Payer: COMMERCIAL

## 2019-01-02 DIAGNOSIS — S32.401D CLOSED DISPLACED FRACTURE OF RIGHT ACETABULUM WITH ROUTINE HEALING, UNSPECIFIED PORTION OF ACETABULUM, SUBSEQUENT ENCOUNTER: Primary | ICD-10-CM

## 2019-01-02 DIAGNOSIS — M25.562 ACUTE PAIN OF LEFT KNEE: ICD-10-CM

## 2019-01-02 PROCEDURE — 97110 THERAPEUTIC EXERCISES: CPT | Performed by: PHYSICAL THERAPIST

## 2019-01-02 NOTE — PROGRESS NOTES
Daily Note     Today's date: 2019  Patient name: Daniela Mckinley  : 1990  MRN: [id number]  Referring provider: Telma Aguilar MD  Dx:   Encounter Diagnosis     ICD-10-CM    1  Closed displaced fracture of right acetabulum with routine healing, unspecified portion of acetabulum, subsequent encounter S32 401D    2  Acute pain of left knee M25 562                   Subjective: Axel Anaya reports that he is doing well overall, feels he is walking better but is limited in his walking due to endurance issues  Notes L groin pain when rolling in bed or twisting when laying down          Objective: See treatment diary below  Precautions: L4-L5 fx and pelvic fx    Daily Treatment Diary     Manual  /2                                                                                 Exercise Diary              Bike 15 min            Elliptical 5 min             Hamstring stretch with strap 30 sec  X3            Supine bent knee push outs Purple  3X10            Hip adduction with bridges 3X10            S/L clamshells Green  3X10            Mod planks on PB 20 sec  X5            TB push press with abdominal brace 3X10            Quadruped alternating UE lifts 2X10            Quadruped alternating LE lifts 2X10            PB squats 3 X10            TB alternating extension with abdominal brace Black  3X10                                                                                                                        Modalities                                                              Assessment: Tolerated treatment well  Patient is responding well to progression of endurance and strength based exercises along with lumbopelvic stabilization  Will progress as able to improve endurance      Plan: Continue per plan of care

## 2019-01-04 ENCOUNTER — OFFICE VISIT (OUTPATIENT)
Dept: PHYSICAL THERAPY | Facility: MEDICAL CENTER | Age: 29
End: 2019-01-04
Payer: COMMERCIAL

## 2019-01-04 DIAGNOSIS — M25.562 ACUTE PAIN OF LEFT KNEE: ICD-10-CM

## 2019-01-04 DIAGNOSIS — S32.401D CLOSED DISPLACED FRACTURE OF RIGHT ACETABULUM WITH ROUTINE HEALING, UNSPECIFIED PORTION OF ACETABULUM, SUBSEQUENT ENCOUNTER: Primary | ICD-10-CM

## 2019-01-07 ENCOUNTER — OFFICE VISIT (OUTPATIENT)
Dept: PHYSICAL THERAPY | Facility: MEDICAL CENTER | Age: 29
End: 2019-01-07
Payer: COMMERCIAL

## 2019-01-07 DIAGNOSIS — M25.562 ACUTE PAIN OF LEFT KNEE: ICD-10-CM

## 2019-01-07 DIAGNOSIS — S32.401D CLOSED DISPLACED FRACTURE OF RIGHT ACETABULUM WITH ROUTINE HEALING, UNSPECIFIED PORTION OF ACETABULUM, SUBSEQUENT ENCOUNTER: Primary | ICD-10-CM

## 2019-01-07 PROCEDURE — 97110 THERAPEUTIC EXERCISES: CPT

## 2019-01-07 NOTE — PROGRESS NOTES
Daily Note     Today's date: 2019  Patient name: Ramona Fraser  : 1990  MRN: [id number]  Referring provider: Ijeoma Galeano MD  Dx:   Encounter Diagnosis     ICD-10-CM    1  Closed displaced fracture of right acetabulum with routine healing, unspecified portion of acetabulum, subsequent encounter S32 401D    2  Acute pain of left knee M25 562        Start Time: 1725  Stop Time: 1820  Total time in clinic (min): 55 minutes    Subjective: Burak reports that his confidence with walking and completing stairs is getting better each day  Patient reports random and sharp LBP  Objective: See treatment diary below  Precautions: L4-L5 fx and pelvic fx    Daily Treatment Diary     Manual                                                                                Exercise Diary              Bike 15 min 10 min           Elliptical 5 min  10 min           Hamstring stretch with strap 30 sec  X3 30 sec x3           Supine bent knee push outs Purple  3X10 Purple  3x10           Hip adduction with bridges 3X10 3x10           S/L clamshells Green  3X10 Blue  2x10           Mod planks on PB 20 sec  X5 20 sec  x5           TB push press with abdominal brace 3X10 Black  3x10           Quadruped alternating UE lifts 2X10 2x10           Quadruped alternating LE lifts 2X10 2x10           PB squats 3 X10 3x10           TB alternating extension with abdominal brace Black  3X10 Black  3x10                                                                                                                     Modalities                                                       Assessment: Tolerated treatment well  Patient is responding well to therapeutic exercise and will benefit from skilled PT to address his current strength impairments  Plan: Continue per plan of care

## 2019-01-09 ENCOUNTER — OFFICE VISIT (OUTPATIENT)
Dept: PHYSICAL THERAPY | Facility: MEDICAL CENTER | Age: 29
End: 2019-01-09
Payer: COMMERCIAL

## 2019-01-09 DIAGNOSIS — S32.401D CLOSED DISPLACED FRACTURE OF RIGHT ACETABULUM WITH ROUTINE HEALING, UNSPECIFIED PORTION OF ACETABULUM, SUBSEQUENT ENCOUNTER: Primary | ICD-10-CM

## 2019-01-09 DIAGNOSIS — M25.562 ACUTE PAIN OF LEFT KNEE: ICD-10-CM

## 2019-01-09 PROCEDURE — 97110 THERAPEUTIC EXERCISES: CPT

## 2019-01-09 NOTE — PROGRESS NOTES
Daily Note     Today's date: 2019  Patient name: Daniela Mckinley  : 1990  MRN: [id number]  Referring provider: Telma Aguilar MD  Dx:   Encounter Diagnosis     ICD-10-CM    1  Closed displaced fracture of right acetabulum with routine healing, unspecified portion of acetabulum, subsequent encounter S32 401D    2  Acute pain of left knee M25 562        Start Time: 1800  Stop Time: 1850  Total time in clinic (min): 50 minutes    Subjective: Burak reports that his random SC joint pain has subsided but his LBP still is random  Objective: See treatment diary below    Precautions: L4-L5 fx and pelvic fx    Daily Treatment Diary     Manual                                                                               Exercise Diary             Bike 15 min 10 min 10 min           Elliptical 5 min  10 min           Hamstring stretch with strap 30 sec  X3 30 sec x3 30 sec x3          Supine bent knee push outs Purple  3X10 Purple  3x10 [id number]          Hip adduction with bridges 3X10 3x10 3x10          S/L clamshells Green  3X10 Blue  2x10 Blue  2x10          Mod planks on PB 20 sec  X5 20 sec  x5 30"x3          TB push press with abdominal brace 3X10 Black  3x10 Black 3x10          Quadruped alternating UE lifts 2X10 2x10 2x10          Quadruped alternating LE lifts 2X10 2x10 2x10          PB squats 3 X10 3x10 3x10          TB alternating extension with abdominal brace Black  3X10 Black  3x10 Black  3x10                                                                                                                    Modalities                                                       Assessment: Tolerated treatment well  Patient continues to progress well in therapy  Patient presents without his cane and ambulates fairly  Antalgic gait noted with no L heel strike and decreased R weight bearing  Plan: Continue per plan of care

## 2019-01-14 ENCOUNTER — OFFICE VISIT (OUTPATIENT)
Dept: PHYSICAL THERAPY | Facility: MEDICAL CENTER | Age: 29
End: 2019-01-14
Payer: COMMERCIAL

## 2019-01-14 DIAGNOSIS — S32.401D CLOSED DISPLACED FRACTURE OF RIGHT ACETABULUM WITH ROUTINE HEALING, UNSPECIFIED PORTION OF ACETABULUM, SUBSEQUENT ENCOUNTER: Primary | ICD-10-CM

## 2019-01-14 DIAGNOSIS — M25.562 ACUTE PAIN OF LEFT KNEE: ICD-10-CM

## 2019-01-14 PROCEDURE — 97110 THERAPEUTIC EXERCISES: CPT

## 2019-01-14 PROCEDURE — 97112 NEUROMUSCULAR REEDUCATION: CPT

## 2019-01-14 NOTE — PROGRESS NOTES
Daily Note     Today's date: 2019  Patient name: Mary James  : 1990  MRN: [id number]  Referring provider: Zurdo Davis MD  Dx:   Encounter Diagnosis     ICD-10-CM    1  Closed displaced fracture of right acetabulum with routine healing, unspecified portion of acetabulum, subsequent encounter S32 401D    2  Acute pain of left knee M25 562          Subjective: Patient noted that he has more pain going down his R leg today throughout the day especially noted with walking  Objective: See treatment diary below      Assessment: Patient arrived late to treatment then needed to use restroom, therefore treatment not started until 5:45pm  Patient noted at initial use of elliptical was ok but noted throughout performing increase of pain proximal anterior R thigh  Patient would benefit from continued PT      Plan: Continue per plan of care       Precautions: L4-L5 fx and pelvic fx     Daily Treatment Diary      Manual                                                                                                                                              Exercise Diary                   Bike 15 min 10 min 10 min   10 min               Elliptical 5 min  10 min                   Hamstring stretch with strap 30 sec  X3 30 sec x3 30 sec x3  30 sec x3               Supine bent knee push outs Purple  3X10 Purple  3x10 [id number]  [id number]               Hip adduction with bridges 3X10 3x10 3x10  3x10               S/L clamshells Green  3X10 Blue  2x10 Blue  2x10  Blue  2x10               Mod planks on PB 20 sec  X5 20 sec  x5 30"x3  30"x3               TB push press with abdominal brace 3X10 Black  3x10 Black 3x10 Black 3x10               Quadruped alternating UE lifts 2X10 2x10 2x10  2x10               Quadruped alternating LE lifts 2X10 2x10 2x10  2x10               PB squats 3 X10 3x10 3x10  3x10               TB alternating extension with abdominal brace Black  3X10 Black  3x10 Black  3x10  Black  3x10                                                                                                                                                                                                                  Modalities

## 2019-01-16 ENCOUNTER — OFFICE VISIT (OUTPATIENT)
Dept: PHYSICAL THERAPY | Facility: MEDICAL CENTER | Age: 29
End: 2019-01-16
Payer: COMMERCIAL

## 2019-01-16 DIAGNOSIS — S32.401D CLOSED DISPLACED FRACTURE OF RIGHT ACETABULUM WITH ROUTINE HEALING, UNSPECIFIED PORTION OF ACETABULUM, SUBSEQUENT ENCOUNTER: Primary | ICD-10-CM

## 2019-01-16 DIAGNOSIS — M25.562 ACUTE PAIN OF LEFT KNEE: ICD-10-CM

## 2019-01-16 PROCEDURE — 97110 THERAPEUTIC EXERCISES: CPT

## 2019-01-16 PROCEDURE — 97112 NEUROMUSCULAR REEDUCATION: CPT

## 2019-01-16 NOTE — PROGRESS NOTES
Daily Note     Today's date: 2019  Patient name: Amos Temple  : 1990  MRN: [id number]  Referring provider: Taryn Carter MD  Dx:   Encounter Diagnosis     ICD-10-CM    1  Closed displaced fracture of right acetabulum with routine healing, unspecified portion of acetabulum, subsequent encounter S32 401D    2  Acute pain of left knee M25 562          Subjective: Patient noted no new complaints today Patient noted that he did not have pain in groin today or anterior proximal thigh  Objective: See treatment diary below      Assessment: Treatment started 22  minutes from patient check in due to patient noting he needed to use the restroom  Increased reps for quadruped alt UE LE  Patient noted no pain post treatment  Patient would benefit from continued PT  Plan: Continue per plan of care         Precautions: L4-L5 fx and pelvic fx     Daily Treatment Diary      Manual                                                                                                                                              Exercise Diary                 Bike 15 min 10 min 10 min   10 min  10min             Elliptical 5 min  10 min    5 min               Hamstring stretch with strap 30 sec  X3 30 sec x3 30 sec x3  30 sec x3  30 sec x3             Supine bent knee push outs Purple  3X10 Purple  3x10 [id number]  [id number]  [id number]             Hip adduction with bridges 3X10 3x10 3x10  3x10  3x10             S/L clamshells Green  3X10 Blue  2x10 Blue  2x10  Blue  2x10  Blue  2x10             Mod planks on PB 20 sec  X5 20 sec  x5 30"x3  30"x3  30"x3             TB push press with abdominal brace 3X10 Black  3x10 Black 3x10 Black 3x10 Black 3x10             Quadruped alternating UE lifts 2X10 2x10 2x10  2x10  2x15             Quadruped alternating LE lifts 2X10 2x10 2x10  2x10 2x15             PB squats 3 X10 3x10 3x10  3x10  3x10             TB alternating extension with abdominal brace Black  3X10 Black  3x10 Black  3x10  Black  3x10 Black 3x10                                                                                                                                                                                                                Modalities

## 2019-01-23 ENCOUNTER — OFFICE VISIT (OUTPATIENT)
Dept: PHYSICAL THERAPY | Facility: MEDICAL CENTER | Age: 29
End: 2019-01-23
Payer: COMMERCIAL

## 2019-01-23 DIAGNOSIS — M25.562 ACUTE PAIN OF LEFT KNEE: ICD-10-CM

## 2019-01-23 DIAGNOSIS — S32.401D CLOSED DISPLACED FRACTURE OF RIGHT ACETABULUM WITH ROUTINE HEALING, UNSPECIFIED PORTION OF ACETABULUM, SUBSEQUENT ENCOUNTER: Primary | ICD-10-CM

## 2019-01-23 PROCEDURE — 97112 NEUROMUSCULAR REEDUCATION: CPT

## 2019-01-23 PROCEDURE — 97110 THERAPEUTIC EXERCISES: CPT

## 2019-01-23 NOTE — PROGRESS NOTES
Daily Note     Today's date: 2019  Patient name: Yaneth Capps  : 1990  MRN: [id number]  Referring provider: Rayshawn Carias MD  Dx:   Encounter Diagnosis     ICD-10-CM    1  Closed displaced fracture of right acetabulum with routine healing, unspecified portion of acetabulum, subsequent encounter S32 401D    2  Acute pain of left knee M25 562        Start Time: 1730  Stop Time: 1815  Total time in clinic (min): 45 minutes    Subjective: Patient reports "feeling pretty good" today prior to start of session    States no pain this PM       Objective: See treatment diary below     Precautions: L4-L5 fx and pelvic fx     Daily Treatment Diary      Manual                                                                                                                                              Exercise Diary               Bike 15 min 10 min 10 min   10 min  10min  10 min           Elliptical 5 min  10 min    5 min               Hamstring stretch with strap 30 sec  X3 30 sec x3 30 sec x3  30 sec x3  30 sec x3  30 sec  x4           Supine bent knee push outs Purple  3X10 Purple  3x10 [id number]  Oiiwtu7s96  Ctvqsp0u25  Purple  3x10           Hip adduction with bridges 3X10 3x10 3x10  3x10  3x10  3x10           S/L clamshells Green  3X10 Blue  2x10 Blue  2x10  Blue  2x10  Blue  2x10  Blue  3x10           Mod planks on PB 20 sec  X5 20 sec  x5 30"x3  30"x3  30"x3  30"x3           TB push press with abdominal brace 3X10 Black  3x10 Black 3x10 Black 3x10 Black 3x10  Black  3x10           Quadruped alternating UE lifts 2X10 2x10 2x10  2x10  2x15  2x15           Quadruped alternating LE lifts 2X10 2x10 2x10  2x10 2x15  2x15           PB squats 3 X10 3x10 3x10  3x10  3x10  3x10           TB alternating extension with abdominal brace Black  3X10 Black  3x10 Black  3x10  Black  3x10 Black 3x10  Black  3x10                                                                                                                                                                                                            Modalities                                                                                                    Assessment: Tolerated treatment well  Patient able to perform all exercise with no increase of pain  Patient is motivated to get better and enjoys being challenged  Patient is challenged by S/L barrie, but was able to perform increased reps this session  Patient would benefit from continued PT  Plan: Continue per plan of care  Progress treatment as tolerated

## 2019-01-25 ENCOUNTER — APPOINTMENT (OUTPATIENT)
Dept: PHYSICAL THERAPY | Facility: MEDICAL CENTER | Age: 29
End: 2019-01-25
Payer: COMMERCIAL

## 2019-01-28 ENCOUNTER — OFFICE VISIT (OUTPATIENT)
Dept: PHYSICAL THERAPY | Facility: MEDICAL CENTER | Age: 29
End: 2019-01-28
Payer: COMMERCIAL

## 2019-01-28 DIAGNOSIS — S32.401D CLOSED DISPLACED FRACTURE OF RIGHT ACETABULUM WITH ROUTINE HEALING, UNSPECIFIED PORTION OF ACETABULUM, SUBSEQUENT ENCOUNTER: Primary | ICD-10-CM

## 2019-01-28 DIAGNOSIS — M25.562 ACUTE PAIN OF LEFT KNEE: ICD-10-CM

## 2019-01-28 PROCEDURE — 97110 THERAPEUTIC EXERCISES: CPT

## 2019-01-28 PROCEDURE — 97112 NEUROMUSCULAR REEDUCATION: CPT

## 2019-01-28 NOTE — PROGRESS NOTES
Daily Note     Today's date: 2019  Patient name: Faustina Velázquez  : 1990  MRN: [id number]  Referring provider: Roni Johnson MD  Dx:   Encounter Diagnosis     ICD-10-CM    1  Closed displaced fracture of right acetabulum with routine healing, unspecified portion of acetabulum, subsequent encounter S32 401D    2  Acute pain of left knee M25 562        Start Time: 9786  Stop Time: 1010  Total time in clinic (min): 45 minutes    Subjective: Pt reports no new findings today prior to start of session    "Everything's doing alright today "      Objective: See treatment diary below   Precautions: L4-L5 fx and pelvic fx     Daily Treatment Diary      Manual                                                                                                                                              Exercise Diary             Bike 15 min 10 min 10 min   10 min  10min  10 min 10 min         Elliptical 5 min  10 min    5 min     5 min         Hamstring stretch with strap 30 sec  X3 30 sec x3 30 sec x3  30 sec x3  30 sec x3  30 sec  x4  30 sec  x4         Supine bent knee push outs Purple  3X10 Purple  3x10 [id number]  Bmopeo1g70  Cnnpke7g54  Purple  3x10  Purple  3x10         Hip adduction with bridges 3X10 3x10 3x10  3x10  3x10  3x10  3x10         S/L clamshells Green  3X10 Blue  2x10 Blue  2x10  Blue  2x10  Blue  2x10  Blue  3x10  Blue  3x10         Mod planks on PB 20 sec  X5 20 sec  x5 30"x3  30"x3  30"x3  30"x3  30"x4         TB push press with abdominal brace 3X10 Black  3x10 Black 3x10 Black 3x10 Black 3x10  Black  3x10  Black  3x10         Quadruped alternating UE lifts 2X10 2x10 2x10  2x10  2x15  2x15  2x15         Quadruped alternating LE lifts 2X10 2x10 2x10  2x10 2x15  2x15  2x15         PB squats 3 X10 3x10 3x10  3x10  3x10  3x10  3x10         TB alternating extension with abdominal brace Black  3X10 Black  3x10 Black  3x10  Black  3x10 Black 3x10  Black  3x10  Black  3x10                                                                                                                                                                                                            Modalities                                                                                                    Assessment: Tolerated treatment well  Pt continues to tolerate all treatment with no increase of pain during session  Reps for stretches and modified planks increased today  Minimal verbal cueing required for proper from during modified planks  Was able to self-correct once cues received  Patient would benefit from continued PT  Plan: Continue per plan of care  Progress treatment as tolerated

## 2019-01-30 ENCOUNTER — OFFICE VISIT (OUTPATIENT)
Dept: PHYSICAL THERAPY | Facility: MEDICAL CENTER | Age: 29
End: 2019-01-30
Payer: COMMERCIAL

## 2019-01-30 ENCOUNTER — TELEPHONE (OUTPATIENT)
Dept: INTERNAL MEDICINE CLINIC | Facility: CLINIC | Age: 29
End: 2019-01-30

## 2019-01-30 DIAGNOSIS — S32.401D CLOSED DISPLACED FRACTURE OF RIGHT ACETABULUM WITH ROUTINE HEALING, UNSPECIFIED PORTION OF ACETABULUM, SUBSEQUENT ENCOUNTER: Primary | ICD-10-CM

## 2019-01-30 DIAGNOSIS — M25.562 ACUTE PAIN OF LEFT KNEE: ICD-10-CM

## 2019-01-30 PROCEDURE — 97112 NEUROMUSCULAR REEDUCATION: CPT

## 2019-01-30 PROCEDURE — 97110 THERAPEUTIC EXERCISES: CPT

## 2019-01-30 NOTE — PROGRESS NOTES
Daily Note     Today's date: 2019  Patient name: Tammie Arndt  : 1990  MRN: [id number]  Referring provider: Hayden Robles MD  Dx:   Encounter Diagnosis     ICD-10-CM    1  Closed displaced fracture of right acetabulum with routine healing, unspecified portion of acetabulum, subsequent encounter S32 401D    2  Acute pain of left knee M25 562                   Subjective: Pt reports that he is doing well today having no complaints of pain         Objective: See treatment diary below   Precautions: L4-L5 fx and pelvic fx     Daily Treatment Diary      Manual                                                                                                                                              Exercise Diary           Bike 15 min 10 min 10 min   10 min  10min  10 min 10 min  10 min       Elliptical 5 min  10 min    5 min     5 min  5 min       Hamstring stretch with strap 30 sec  X3 30 sec x3 30 sec x3  30 sec x3  30 sec x3  30 sec  x4  30 sec  x4  30" x4       Supine bent knee push outs Purple  3X10 Purple  3x10 Derche2x44  Wkjred7w56  Vehfgn9n01  Purple  3x10  Purple  3x10  Purple 3x10       Hip adduction with bridges 3X10 3x10 3x10  3x10  3x10  3x10  3x10  3x10       S/L clamshells Green  3X10 Blue  2x10 Blue  2x10  Blue  2x10  Blue  2x10  Blue  3x10  Blue  3x10  Blue 3x10       Mod planks on PB 20 sec  X5 20 sec  x5 30"x3  30"x3  30"x3  30"x3  30"x4  30"x4       TB push press with abdominal brace 3X10 Black  3x10 Black 3x10 Black 3x10 Black 3x10  Black  3x10  Black  3x10  Black 3x10       Quadruped alternating UE lifts 2X10 2x10 2x10  2x10  2x15  2x15  2x15  2x15       Quadruped alternating LE lifts 2X10 2x10 2x10  2x10 2x15  2x15  2x15  2x15       PB squats 3 X10 3x10 3x10  3x10  3x10  3x10  3x10  3x10       TB alternating extension with abdominal brace Black  3X10 Black  3x10 Black  3x10  Black  3x10 Black 3x10  Black  3x10  Black  3x10  Black 3x10                                                                                                                                                                                                          Modalities                                                                                                    Assessment: Tolerated treatment well  Continues with all TE again today having no increased pain or other symptoms  Muscular fatigue still continues to be present towards end of session  Patient would benefit from continued PT  Plan: Continue per plan of care  Progress treatment as tolerated

## 2019-01-31 ENCOUNTER — HOSPITAL ENCOUNTER (OUTPATIENT)
Dept: RADIOLOGY | Facility: HOSPITAL | Age: 29
Discharge: HOME | End: 2019-01-31
Attending: ORTHOPAEDIC SURGERY
Payer: COMMERCIAL

## 2019-01-31 ENCOUNTER — OFFICE VISIT (OUTPATIENT)
Dept: OBGYN CLINIC | Facility: HOSPITAL | Age: 29
End: 2019-01-31
Payer: COMMERCIAL

## 2019-01-31 VITALS
WEIGHT: 228 LBS | DIASTOLIC BLOOD PRESSURE: 84 MMHG | SYSTOLIC BLOOD PRESSURE: 124 MMHG | BODY MASS INDEX: 34.56 KG/M2 | HEIGHT: 68 IN | HEART RATE: 71 BPM

## 2019-01-31 DIAGNOSIS — Z98.890 HISTORY OF OPEN REDUCTION AND INTERNAL FIXATION (ORIF) PROCEDURE: ICD-10-CM

## 2019-01-31 DIAGNOSIS — Z48.89 AFTERCARE FOLLOWING SURGERY: ICD-10-CM

## 2019-01-31 DIAGNOSIS — Z48.89 AFTERCARE FOLLOWING SURGERY: Primary | ICD-10-CM

## 2019-01-31 PROCEDURE — 72170 X-RAY EXAM OF PELVIS: CPT

## 2019-01-31 PROCEDURE — 99213 OFFICE O/P EST LOW 20 MIN: CPT | Performed by: ORTHOPAEDIC SURGERY

## 2019-01-31 NOTE — LETTER
January 31, 2019     Patient: Reji Aguirre   YOB: 1990   Date of Visit: 1/31/2019       To Whom it May Concern:    Reji Aguirre is under my professional care  He was seen in my office on 1/31/2019  He underwent operative fixation of numerous pelvic fractures, and has now healed    He is permitted to return to work after February 18th, without restriction to lifting and standing    If you have any questions or concerns, please don't hesitate to call           Sincerely,          Mayra Knapp MD        CC: Reji Krugert

## 2019-02-04 ENCOUNTER — TELEPHONE (OUTPATIENT)
Dept: INTERNAL MEDICINE CLINIC | Facility: CLINIC | Age: 29
End: 2019-02-04

## 2019-02-05 DIAGNOSIS — N46.9 INFERTILITY MALE: Primary | ICD-10-CM

## 2019-02-05 DIAGNOSIS — Z31.41 ENCOUNTER FOR SPERM COUNT FOR FERTILITY TESTING: ICD-10-CM

## 2019-02-07 ENCOUNTER — TELEPHONE (OUTPATIENT)
Dept: INTERNAL MEDICINE CLINIC | Facility: CLINIC | Age: 29
End: 2019-02-07

## 2019-02-11 ENCOUNTER — TELEPHONE (OUTPATIENT)
Dept: MULTI SPECIALTY CLINIC | Facility: CLINIC | Age: 29
End: 2019-02-11

## 2019-02-11 NOTE — TELEPHONE ENCOUNTER
[facility] placed in the (green) team folder in the provider flow station  (FAX, MAIL, CALL PATIENT) when form is complete  Please place the form in the (green) team folder in the Clinical flow station at the [address] and reply to this task to the Clinical Pool

## 2019-12-09 ENCOUNTER — HOSPITAL ENCOUNTER (EMERGENCY)
Facility: HOSPITAL | Age: 29
Discharge: HOME | End: 2019-12-09
Attending: EMERGENCY MEDICINE

## 2019-12-09 VITALS
HEIGHT: 68 IN | TEMPERATURE: 98.2 F | SYSTOLIC BLOOD PRESSURE: 128 MMHG | OXYGEN SATURATION: 99 % | WEIGHT: 216 LBS | DIASTOLIC BLOOD PRESSURE: 83 MMHG | BODY MASS INDEX: 32.74 KG/M2 | RESPIRATION RATE: 18 BRPM | HEART RATE: 78 BPM

## 2019-12-09 DIAGNOSIS — J11.1 FLU SYNDROME: Primary | ICD-10-CM

## 2019-12-09 PROCEDURE — 99283 EMERGENCY DEPT VISIT LOW MDM: CPT

## 2019-12-09 PROCEDURE — 99284 EMERGENCY DEPT VISIT MOD MDM: CPT | Performed by: EMERGENCY MEDICINE

## 2019-12-09 RX ORDER — IBUPROFEN 600 MG/1
600 TABLET ORAL EVERY 6 HOURS PRN
Qty: 30 TABLET | Refills: 0 | Status: SHIPPED | OUTPATIENT
Start: 2019-12-09

## 2019-12-09 RX ORDER — ONDANSETRON 4 MG/1
4 TABLET, ORALLY DISINTEGRATING ORAL EVERY 6 HOURS PRN
Qty: 12 TABLET | Refills: 0 | Status: SHIPPED | OUTPATIENT
Start: 2019-12-09

## 2019-12-09 RX ORDER — ACETAMINOPHEN 500 MG
1000 TABLET ORAL EVERY 6 HOURS PRN
Qty: 30 TABLET | Refills: 0 | Status: SHIPPED | OUTPATIENT
Start: 2019-12-09

## 2019-12-10 NOTE — ED PROVIDER NOTES
History  Chief Complaint   Patient presents with    Fever - 9 weeks to 74 years     Pt states since thursday he's been feverish, congested and dizzy  States "I threw up today at work "      Patient is a 79-year-old male with no significant past history presents with 5 days flu-like symptoms  Symptoms started on Thursday with fevers/chills, body aches and nasal congestion  Patient developed cough, nausea with 1 episode of vomiting  Symptoms are moderate severity and improved with Tylenol at home  No associated abdominal pain, urinary symptoms, neck stiffness  No sick contacts  Patient did not receive flu shot this year  Prior to Admission Medications   Prescriptions Last Dose Informant Patient Reported? Taking?   enoxaparin (LOVENOX) 60 mg/0 6 mL Not Taking at Unknown time  Yes No   methocarbamol (ROBAXIN) 500 mg tablet Not Taking at Unknown time  No No   Sig: Take 1 tablet (500 mg total) by mouth every 6 (six) hours as needed for muscle spasms   Patient not taking: Reported on 11/20/2018    multivitamin (THERAGRAN) TABS Not Taking at Unknown time  Yes No   Sig: Take 1 tablet by mouth daily   nabumetone (RELAFEN) 750 mg tablet Not Taking at Unknown time  No No   Sig: Take 1 tablet (750 mg total) by mouth 2 (two) times a day   Patient not taking: Reported on 12/9/2019   nicotine (NICODERM CQ) 14 mg/24hr TD 24 hr patch Not Taking at Unknown time  No No   Sig: Place 1 patch on the skin daily   Patient not taking: Reported on 10/16/2018    senna (SENOKOT) 8 6 mg Not Taking at Unknown time  No No   Sig: Take 2 tablets (17 2 mg total) by mouth daily at bedtime   Patient not taking: Reported on 10/16/2018       Facility-Administered Medications: None       History reviewed  No pertinent past medical history      Past Surgical History:   Procedure Laterality Date    CIRCUMCISION      Elective    ORIF PELVIS Bilateral 9/27/2018    Procedure: OPEN REDUCTION W/ INTERNAL FIXATION (ORIF) PELVIS ANTERIOR/POSTERIOR APPROACH, ORIF PUBIC SYMPHYSIS;  Surgeon: Vena Blizzard, MD;  Location: [facility];  Service: Orthopedics       Family History   Problem Relation Age of Onset    Diabetes Mother      I have reviewed and agree with the history as documented  Social History     Tobacco Use    Smoking status: Current Every Day Smoker     Packs/day: 0 25     Years: 15 00     Pack years: 3 75    Smokeless tobacco: Never Used    Tobacco comment: pack per week    Substance Use Topics    Alcohol use: No    Drug use: Yes     Frequency: 1 0 times per week     Types: Marijuana        Review of Systems   Constitutional: Positive for appetite change, chills and fever  Negative for fatigue  HENT: Positive for congestion and sore throat  Eyes: Negative for visual disturbance  Respiratory: Positive for cough  Negative for shortness of breath  Cardiovascular: Negative for chest pain  Gastrointestinal: Positive for nausea and vomiting  Negative for abdominal pain and diarrhea  Endocrine: Negative for polyuria  Genitourinary: Negative for difficulty urinating and dysuria  Musculoskeletal: Negative for arthralgias  Skin: Negative for rash  Neurological: Negative for dizziness, light-headedness and headaches  All other systems reviewed and are negative        Physical Exam  ED Triage Vitals [12/09/19 2122]   Temperature Pulse Respirations Blood Pressure SpO2   98 2 °F (36 8 °C) 78 18 128/83 99 %      Temp Source Heart Rate Source Patient Position - Orthostatic VS BP Location FiO2 (%)   Oral -- Sitting Right arm --      Pain Score       No Pain             Orthostatic Vital Signs  Vitals:    12/09/19 2122   BP: 128/83   Pulse: 78   Patient Position - Orthostatic VS: Sitting       Physical Exam    ED Medications  Medications - No data to display    Diagnostic Studies  Results Reviewed     None                 No orders to display         Procedures  Procedures      ED Course                               MDM  Number of Diagnoses or Management Options  Flu syndrome:   Diagnosis management comments:  Patient is a 19-year-old male with no significant past history presents with 5 days flu-like symptoms  Exam nonfocal   Consistent with flu  Will treat symptomatically and discharge with return precautions  Disposition  Final diagnoses:   Flu syndrome     Time reflects when diagnosis was documented in both MDM as applicable and the Disposition within this note     Time User Action Codes Description Comment    12/9/2019 10:27 PM Dallas Phillips Add [J11 1] Flu syndrome       ED Disposition     ED Disposition Condition Date/Time Comment    Discharge Stable Mon Dec 9, 2019 10:26 PM [facility] discharge to home/self care              Follow-up Information     Follow up With Specialties Details Why [address] [facility] Emergency Department Emergency Medicine  As needed, If symptoms worsen [address] [place] [address], [address], [address]   [phone]          Discharge Medication List as of 12/9/2019 10:28 PM      START taking these medications    Details   acetaminophen (TYLENOL) 500 mg tablet Take 2 tablets (1,000 mg total) by mouth every 6 (six) hours as needed for mild pain or fever, Starting Mon 12/9/2019, Print      ibuprofen (MOTRIN) 600 mg tablet Take 1 tablet (600 mg total) by mouth every 6 (six) hours as needed for moderate pain or fever, Starting Mon 12/9/2019, Print      ondansetron (ZOFRAN-ODT) 4 mg disintegrating tablet Take 1 tablet (4 mg total) by mouth every 6 (six) hours as needed for nausea or vomiting, Starting Mon 12/9/2019, Print         CONTINUE these medications which have NOT CHANGED    Details   enoxaparin (LOVENOX) 60 mg/0 6 mL Starting Mon 10/1/2018, Historical Med      methocarbamol (ROBAXIN) 500 mg tablet Take 1 tablet (500 mg total) by mouth every 6 (six) hours as needed for muscle spasms, Starting Mon 10/1/2018, Print      multivitamin (THERAGRAN) TABS Take 1 tablet by mouth daily, Historical Med      nabumetone (RELAFEN) 750 mg tablet Take 1 tablet (750 mg total) by mouth 2 (two) times a day, Starting Tue 12/18/2018, Normal      nicotine (NICODERM CQ) 14 mg/24hr TD 24 hr patch Place 1 patch on the skin daily, Starting Tue 10/2/2018, Print      senna (SENOKOT) 8 6 mg Take 2 tablets (17 2 mg total) by mouth daily at bedtime, Starting Mon 10/1/2018, Print           No discharge procedures on file  ED Provider  Attending physically available and evaluated [facility]  I managed the patient along with the ED Attending      Electronically Signed by         Caren Anderson MD  12/11/19 9942

## 2019-12-10 NOTE — ED ATTENDING ATTESTATION
12/9/2019  IJaylan MD, saw and evaluated the patient  I have discussed the patient with the resident/non-physician practitioner and agree with the resident's/non-physician practitioner's findings, Plan of Care, and MDM as documented in the resident's/non-physician practitioner's note, except where noted  All available labs and Radiology studies were reviewed  I was present for key portions of any procedure(s) performed by the resident/non-physician practitioner and I was immediately available to provide assistance  At this point I agree with the current assessment done in the Emergency Department  I have conducted an independent evaluation of this patient a history and physical is as follows:    Impression:  Acute viral illness  Patient is well appearing well hydrated  no acute distress  Plan will be for supportive care discharge home following private care provider        Impression:           Critical Care Time  Procedures

## 2019-12-27 NOTE — QUICK NOTE
Physical Exam   Constitutional: He is oriented to person, place, and time  He appears well-developed and well-nourished  HENT:   Head: Normocephalic and atraumatic  Eyes: Pupils are equal, round, and reactive to light  EOM are normal    Neck: Normal range of motion  Neck supple  Cardiovascular: Normal rate and regular rhythm  Pulmonary/Chest: Effort normal and breath sounds normal    Abdominal: Soft  There is no tenderness  Musculoskeletal: Normal range of motion  He exhibits no edema  Neurological: He is alert and oriented to person, place, and time  Skin: Skin is warm and dry  No rash noted  Psychiatric: He has a normal mood and affect   His behavior is normal

## 2020-06-30 ENCOUNTER — HOSPITAL ENCOUNTER (EMERGENCY)
Facility: HOSPITAL | Age: 30
Discharge: HOME | End: 2020-06-30
Attending: EMERGENCY MEDICINE

## 2020-06-30 VITALS
BODY MASS INDEX: 33.45 KG/M2 | HEART RATE: 58 BPM | SYSTOLIC BLOOD PRESSURE: 140 MMHG | TEMPERATURE: 97.5 F | RESPIRATION RATE: 18 BRPM | WEIGHT: 220 LBS | OXYGEN SATURATION: 97 % | DIASTOLIC BLOOD PRESSURE: 86 MMHG

## 2020-06-30 DIAGNOSIS — Z20.822 ENCOUNTER FOR LABORATORY TESTING FOR COVID-19 VIRUS: Primary | ICD-10-CM

## 2020-06-30 LAB — SARS-COV-2 RNA RESP QL NAA+PROBE: POSITIVE

## 2020-06-30 PROCEDURE — 99284 EMERGENCY DEPT VISIT MOD MDM: CPT

## 2020-06-30 PROCEDURE — 99281 EMR DPT VST MAYX REQ PHY/QHP: CPT | Performed by: EMERGENCY MEDICINE

## 2020-06-30 PROCEDURE — U0003 INFECTIOUS AGENT DETECTION BY NUCLEIC ACID (DNA OR RNA); SEVERE ACUTE RESPIRATORY SYNDROME CORONAVIRUS 2 (SARS-COV-2) (CORONAVIRUS DISEASE [COVID-19]), AMPLIFIED PROBE TECHNIQUE, MAKING USE OF HIGH THROUGHPUT TECHNOLOGIES AS DESCRIBED BY CMS-2020-01-R: HCPCS | Performed by: EMERGENCY MEDICINE

## 2020-07-01 NOTE — DISCHARGE INSTRUCTIONS

## 2020-07-01 NOTE — ED PROVIDER NOTES
History  Chief Complaint   Patient presents with    Loss of Appetite     pt reports inability to smell or taste since this weekend,, diarrhea, back pain, no fever, no chills, no sore throat, no abdominal pain     53-year-old male no pertinent past medical history presents to ED stating that approximately 2-3 days ago he started to notice he was not able to smell or taste anything  He also states that he has been having myalgias with some back pain and some diarrhea that is nonbloody  Patient denies fever, sore throat, cough, chest pain, shortness of breath, abdominal pain, nausea or vomiting, known exposure to anybody with coronavirus  Patient states he does work in a [employer] [address] [employer] where there have been a few cases  Nobody sick at home  Prior to Admission Medications   Prescriptions Last Dose Informant Patient Reported?  Taking?   acetaminophen (TYLENOL) 500 mg tablet   No No   Sig: Take 2 tablets (1,000 mg total) by mouth every 6 (six) hours as needed for mild pain or fever   enoxaparin (LOVENOX) 60 mg/0 6 mL   Yes No   ibuprofen (MOTRIN) 600 mg tablet   No No   Sig: Take 1 tablet (600 mg total) by mouth every 6 (six) hours as needed for moderate pain or fever   methocarbamol (ROBAXIN) 500 mg tablet   No No   Sig: Take 1 tablet (500 mg total) by mouth every 6 (six) hours as needed for muscle spasms   Patient not taking: Reported on 11/20/2018    multivitamin (THERAGRAN) TABS   Yes Yes   Sig: Take 1 tablet by mouth daily   nabumetone (RELAFEN) 750 mg tablet   No No   Sig: Take 1 tablet (750 mg total) by mouth 2 (two) times a day   Patient not taking: Reported on 12/9/2019   nicotine (NICODERM CQ) 14 mg/24hr TD 24 hr patch   No No   Sig: Place 1 patch on the skin daily   Patient not taking: Reported on 10/16/2018    ondansetron (ZOFRAN-ODT) 4 mg disintegrating tablet   No No   Sig: Take 1 tablet (4 mg total) by mouth every 6 (six) hours as needed for nausea or vomiting   senna (SENOKOT) 8 6 mg   No No   Sig: Take 2 tablets (17 2 mg total) by mouth daily at bedtime   Patient not taking: Reported on 10/16/2018       Facility-Administered Medications: None       History reviewed  No pertinent past medical history  Past Surgical History:   Procedure Laterality Date    CIRCUMCISION      Elective    ORIF PELVIS Bilateral 9/27/2018    Procedure: OPEN REDUCTION W/ INTERNAL FIXATION (ORIF) PELVIS ANTERIOR/POSTERIOR APPROACH, ORIF PUBIC SYMPHYSIS;  Surgeon: Stacey Tai MD;  Location: [facility];  Service: Orthopedics       Family History   Problem Relation Age of Onset    Diabetes Mother      I have reviewed and agree with the history as documented  E-Cigarette/Vaping     E-Cigarette/Vaping Substances     Social History     Tobacco Use    Smoking status: Current Every Day Smoker     Packs/day: 0 25     Years: 15 00     Pack years: 3 75    Smokeless tobacco: Never Used    Tobacco comment: pack per week    Substance Use Topics    Alcohol use: No    Drug use: Yes     Frequency: 1 0 times per week     Types: Marijuana        Review of Systems   HENT:        Loss of sense of smell and taste   Musculoskeletal: Positive for back pain and myalgias  All other systems reviewed and are negative  Physical Exam  ED Triage Vitals [06/30/20 2033]   Temperature Pulse Respirations Blood Pressure SpO2   97 5 °F (36 4 °C) 58 18 140/86 97 %      Temp Source Heart Rate Source Patient Position - Orthostatic VS BP Location FiO2 (%)   Tympanic Monitor Sitting Left arm --      Pain Score       --             Orthostatic Vital Signs  Vitals:    06/30/20 2033   BP: 140/86   Pulse: 58   Patient Position - Orthostatic VS: Sitting       Physical Exam   Constitutional: He is oriented to person, place, and time  He appears well-developed and well-nourished  No distress  HENT:   Head: Normocephalic and atraumatic     Right Ear: External ear normal    Left Ear: External ear normal    Nose: Nose normal    Eyes: Conjunctivae are normal  Right eye exhibits no discharge  Left eye exhibits no discharge  No scleral icterus  Neck: Normal range of motion  Neck supple  Cardiovascular: Normal rate, regular rhythm, normal heart sounds and intact distal pulses  Exam reveals no gallop and no friction rub  No murmur heard  Pulmonary/Chest: Effort normal and breath sounds normal  No respiratory distress  He has no wheezes  He has no rales  Abdominal: Soft  Bowel sounds are normal  He exhibits no distension and no mass  There is no tenderness  There is no guarding  Musculoskeletal: Normal range of motion  He exhibits no edema, tenderness or deformity  Neurological: He is alert and oriented to person, place, and time  Skin: Skin is warm and dry  Capillary refill takes less than 2 seconds  No rash noted  He is not diaphoretic  No erythema  No pallor  Psychiatric: He has a normal mood and affect  His behavior is normal  Judgment and thought content normal    Nursing note and vitals reviewed  ED Medications  Medications - No data to display    Diagnostic Studies  Results Reviewed     Procedure Component Value Units Date/Time    [facility]vel Coronavirus [facility][id number]  (Abnormal) Collected:  06/30/20 2053    Lab Status:  Final result Specimen:  Nares from Nose Updated:  06/30/20 2213     SARS-CoV-2 Positive    Narrative: The specimen collection materials, transport medium, and/or testing methodology utilized in the production of these test results have been proven to be reliable in a limited validation with an abbreviated program under the Emergency Utilization Authorization provided by the FDA  Testing reported as "Presumptive positive" will be confirmed with secondary testing with a reference laboratory to ensure result accuracy  Clinical caution and judgement should be used with the interpretation of these results with consideration of the clinical impression and other laboratory testing    Testing reported as "Positive" or "Negative" has been proven to be accurate according to standard laboratory validation requirements  All testing is performed with control materials showing appropriate reactivity at standard intervals  No orders to display         Procedures  Procedures      ED Course                                           MDM  Number of Diagnoses or Management Options  Encounter for laboratory testing for COVID-19 virus:   Diagnosis management comments: Patient presenting with symptoms concerning for coronavirus  Discussed that we would swab him but that he should stay home from work and quarantine himself at home  Continue to wear mask  Explained the patient that he has any new or worsening symptoms including but not limited to cough, chest pain, shortness of breath, difficulty breathing that he should immediately return to the emergency room  Patient states understanding of this  Called patient at [phone] at phone number listed in the chart to tell him that he was positive for coronavirus and that he should stay out of work for approximately 2 weeks, keep himself quarantine at home, continue to wear mask and if he starts to have any new or worsening symptoms that he should immediately return to the emergency department  Patient states understanding of this  Disposition  Final diagnoses:   Encounter for laboratory testing for COVID-19 virus     Time reflects when diagnosis was documented in both MDM as applicable and the Disposition within this note     Time User Action Codes Description Comment    6/30/2020  9:14 PM Bang Durbin Add [Z11 59] Encounter for laboratory testing for COVID-19 virus       ED Disposition     ED Disposition Condition Date/Time Comment    Discharge Stable Tue Jun 30, 2020  9:14 PM [facility] discharge to home/self care              Follow-up Information     Follow up With Specialties Details Why Contact Info Additional Information    [facility]  Call For a Primary Care Doctor [address] [place] [address] Emergency Department Emergency Medicine Go to  As needed, If symptoms worsen [address]  [phone]  ED, [address] [place] [address], [place], [place], [id number] [phone]          Discharge Medication List as of 6/30/2020  9:15 PM      CONTINUE these medications which have NOT CHANGED    Details   multivitamin (THERAGRAN) TABS Take 1 tablet by mouth daily, Historical Med      acetaminophen (TYLENOL) 500 mg tablet Take 2 tablets (1,000 mg total) by mouth every 6 (six) hours as needed for mild pain or fever, Starting Mon 12/9/2019, Print      enoxaparin (LOVENOX) 60 mg/0 6 mL Starting Mon 10/1/2018, Historical Med      ibuprofen (MOTRIN) 600 mg tablet Take 1 tablet (600 mg total) by mouth every 6 (six) hours as needed for moderate pain or fever, Starting Mon 12/9/2019, Print      methocarbamol (ROBAXIN) 500 mg tablet Take 1 tablet (500 mg total) by mouth every 6 (six) hours as needed for muscle spasms, Starting Mon 10/1/2018, Print      nabumetone (RELAFEN) 750 mg tablet Take 1 tablet (750 mg total) by mouth 2 (two) times a day, Starting Tue 12/18/2018, Normal      nicotine (NICODERM CQ) 14 mg/24hr TD 24 hr patch Place 1 patch on the skin daily, Starting Tue 10/2/2018, Print      ondansetron (ZOFRAN-ODT) 4 mg disintegrating tablet Take 1 tablet (4 mg total) by mouth every 6 (six) hours as needed for nausea or vomiting, Starting Mon 12/9/2019, Print      senna (SENOKOT) 8 6 mg Take 2 tablets (17 2 mg total) by mouth daily at bedtime, Starting Mon 10/1/2018, Print           No discharge procedures on file  PDMP Review     None           ED Provider  Attending physically available and evaluated [facility]  I managed the patient along with the ED Attending      Electronically Signed by         Marcus Browning DO  06/30/20 1726       Marcus Browning DO  06/30/20 5376

## 2020-07-03 NOTE — ED ATTENDING ATTESTATION
6/30/2020  I, Taurus Gutierrez MD, saw and evaluated the patient  I have discussed the patient with the resident/non-physician practitioner and agree with the resident's/non-physician practitioner's findings, Plan of Care, and MDM as documented in the resident's/non-physician practitioner's note, except where noted  All available labs and Radiology studies were reviewed  I was present for key portions of any procedure(s) performed by the resident/non-physician practitioner and I was immediately available to provide assistance  At this point I agree with the current assessment done in the Emergency Department  I have conducted an independent evaluation of this patient a history and physical is as follows:    ED Course     Patient presents for evaluation with difficulty spelling and tasting the last 2-3 days  Patient also reports some myalgias and diarrhea  No fever, cough, or shortness of breath  Patient is concerned that he may have COVID  No additional complaints  Exam: AAOx3, NAD, RRR, CTA, S/NT/ND  A/P:  COVID rule out    Will check test     Critical Care Time  Procedures

## 2020-10-19 ENCOUNTER — APPOINTMENT (EMERGENCY)
Dept: RADIOLOGY | Facility: HOSPITAL | Age: 30
End: 2020-10-19

## 2020-10-19 ENCOUNTER — HOSPITAL ENCOUNTER (EMERGENCY)
Facility: HOSPITAL | Age: 30
Discharge: HOME | End: 2020-10-19
Attending: EMERGENCY MEDICINE

## 2020-10-19 VITALS
HEART RATE: 85 BPM | DIASTOLIC BLOOD PRESSURE: 89 MMHG | WEIGHT: 217 LBS | TEMPERATURE: 98.4 F | BODY MASS INDEX: 32.89 KG/M2 | RESPIRATION RATE: 18 BRPM | HEIGHT: 68 IN | SYSTOLIC BLOOD PRESSURE: 136 MMHG | OXYGEN SATURATION: 98 %

## 2020-10-19 DIAGNOSIS — M25.511 RIGHT SHOULDER PAIN: Primary | ICD-10-CM

## 2020-10-19 PROCEDURE — 99283 EMERGENCY DEPT VISIT LOW MDM: CPT

## 2020-10-19 PROCEDURE — 73030 X-RAY EXAM OF SHOULDER: CPT

## 2020-10-19 PROCEDURE — 99282 EMERGENCY DEPT VISIT SF MDM: CPT | Performed by: EMERGENCY MEDICINE

## 2020-10-19 RX ORDER — IBUPROFEN 800 MG/1
800 TABLET ORAL 3 TIMES DAILY
Qty: 21 TABLET | Refills: 0 | Status: SHIPPED | OUTPATIENT
Start: 2020-10-19

## 2020-10-19 RX ORDER — ACETAMINOPHEN 325 MG/1
975 TABLET ORAL ONCE
Status: DISCONTINUED | OUTPATIENT
Start: 2020-10-19 | End: 2020-10-19 | Stop reason: HOSPADM

## 2020-10-19 RX ORDER — KETOROLAC TROMETHAMINE 30 MG/ML
15 INJECTION, SOLUTION INTRAMUSCULAR; INTRAVENOUS ONCE
Status: DISCONTINUED | OUTPATIENT
Start: 2020-10-19 | End: 2020-10-19 | Stop reason: HOSPADM

## 2020-10-26 ENCOUNTER — APPOINTMENT (EMERGENCY)
Dept: RADIOLOGY | Facility: HOSPITAL | Age: 30
End: 2020-10-26

## 2020-10-26 ENCOUNTER — HOSPITAL ENCOUNTER (EMERGENCY)
Facility: HOSPITAL | Age: 30
Discharge: HOME | End: 2020-10-26
Attending: EMERGENCY MEDICINE

## 2020-10-26 VITALS
WEIGHT: 217 LBS | TEMPERATURE: 98.3 F | BODY MASS INDEX: 32.99 KG/M2 | SYSTOLIC BLOOD PRESSURE: 176 MMHG | DIASTOLIC BLOOD PRESSURE: 99 MMHG | HEART RATE: 95 BPM | RESPIRATION RATE: 18 BRPM | OXYGEN SATURATION: 98 %

## 2020-10-26 DIAGNOSIS — L03.90 CELLULITIS: Primary | ICD-10-CM

## 2020-10-26 LAB
ANION GAP SERPL CALCULATED.3IONS-SCNC: 4 MMOL/L (ref 4–13)
BASOPHILS # BLD AUTO: 0.03 THOUSANDS/ΜL (ref 0–0.1)
BASOPHILS NFR BLD AUTO: 1 % (ref 0–1)
BUN SERPL-MCNC: 13 MG/DL (ref 5–25)
CALCIUM SERPL-MCNC: 8.2 MG/DL (ref 8.3–10.1)
CHLORIDE SERPL-SCNC: 104 MMOL/L (ref 100–108)
CO2 SERPL-SCNC: 28 MMOL/L (ref 21–32)
CREAT SERPL-MCNC: 1.19 MG/DL (ref 0.6–1.3)
EOSINOPHIL # BLD AUTO: 0.44 THOUSAND/ΜL (ref 0–0.61)
EOSINOPHIL NFR BLD AUTO: 7 % (ref 0–6)
ERYTHROCYTE [DISTWIDTH] IN BLOOD BY AUTOMATED COUNT: 11.9 % (ref 11.6–15.1)
GFR SERPL CREATININE-BSD FRML MDRD: 81 ML/MIN/1.73SQ M
GLUCOSE SERPL-MCNC: 112 MG/DL (ref 65–140)
HCT VFR BLD AUTO: 47.2 % (ref 36.5–49.3)
HGB BLD-MCNC: 16.2 G/DL (ref 12–17)
IMM GRANULOCYTES # BLD AUTO: 0.02 THOUSAND/UL (ref 0–0.2)
IMM GRANULOCYTES NFR BLD AUTO: 0 % (ref 0–2)
LYMPHOCYTES # BLD AUTO: 2.66 THOUSANDS/ΜL (ref 0.6–4.47)
LYMPHOCYTES NFR BLD AUTO: 40 % (ref 14–44)
MCH RBC QN AUTO: 32.6 PG (ref 26.8–34.3)
MCHC RBC AUTO-ENTMCNC: 34.3 G/DL (ref 31.4–37.4)
MCV RBC AUTO: 95 FL (ref 82–98)
MONOCYTES # BLD AUTO: 0.72 THOUSAND/ΜL (ref 0.17–1.22)
MONOCYTES NFR BLD AUTO: 11 % (ref 4–12)
NEUTROPHILS # BLD AUTO: 2.76 THOUSANDS/ΜL (ref 1.85–7.62)
NEUTS SEG NFR BLD AUTO: 41 % (ref 43–75)
NRBC BLD AUTO-RTO: 0 /100 WBCS
PLATELET # BLD AUTO: 234 THOUSANDS/UL (ref 149–390)
PMV BLD AUTO: 10.5 FL (ref 8.9–12.7)
POTASSIUM SERPL-SCNC: 3.8 MMOL/L (ref 3.5–5.3)
RBC # BLD AUTO: 4.97 MILLION/UL (ref 3.88–5.62)
SODIUM SERPL-SCNC: 136 MMOL/L (ref 136–145)
WBC # BLD AUTO: 6.63 THOUSAND/UL (ref 4.31–10.16)

## 2020-10-26 PROCEDURE — 80048 BASIC METABOLIC PNL TOTAL CA: CPT | Performed by: EMERGENCY MEDICINE

## 2020-10-26 PROCEDURE — 96365 THER/PROPH/DIAG IV INF INIT: CPT

## 2020-10-26 PROCEDURE — 36415 COLL VENOUS BLD VENIPUNCTURE: CPT | Performed by: EMERGENCY MEDICINE

## 2020-10-26 PROCEDURE — 99284 EMERGENCY DEPT VISIT MOD MDM: CPT

## 2020-10-26 PROCEDURE — 73630 X-RAY EXAM OF FOOT: CPT

## 2020-10-26 PROCEDURE — 85025 COMPLETE CBC W/AUTO DIFF WBC: CPT | Performed by: EMERGENCY MEDICINE

## 2020-10-26 PROCEDURE — 96372 THER/PROPH/DIAG INJ SC/IM: CPT

## 2020-10-26 PROCEDURE — 99284 EMERGENCY DEPT VISIT MOD MDM: CPT | Performed by: EMERGENCY MEDICINE

## 2020-10-26 RX ORDER — KETOROLAC TROMETHAMINE 30 MG/ML
15 INJECTION, SOLUTION INTRAMUSCULAR; INTRAVENOUS ONCE
Status: COMPLETED | OUTPATIENT
Start: 2020-10-26 | End: 2020-10-26

## 2020-10-26 RX ORDER — AMOXICILLIN AND CLAVULANATE POTASSIUM 875; 125 MG/1; MG/1
1 TABLET, FILM COATED ORAL EVERY 12 HOURS
Qty: 20 TABLET | Refills: 0 | Status: SHIPPED | OUTPATIENT
Start: 2020-10-26 | End: 2020-11-05

## 2020-10-26 RX ADMIN — KETOROLAC TROMETHAMINE 15 MG: 30 INJECTION, SOLUTION INTRAMUSCULAR at 20:12

## 2020-10-26 RX ADMIN — SODIUM CHLORIDE 3 G: 9 INJECTION, SOLUTION INTRAVENOUS at 20:17

## 2021-04-01 NOTE — ASSESSMENT & PLAN NOTE
- Postop day 2 with Orthopedics status post repair  - Hgb 10 6 yesterday  - neurovascular checks  - NWB RLE, LLE stand to transfer 50

## 2021-07-07 ENCOUNTER — OFFICE VISIT (OUTPATIENT)
Dept: URGENT CARE | Age: 31
End: 2021-07-07

## 2021-07-07 VITALS — HEART RATE: 100 BPM | RESPIRATION RATE: 20 BRPM | OXYGEN SATURATION: 99 % | TEMPERATURE: 98 F

## 2021-07-07 DIAGNOSIS — Z20.822 CONTACT WITH AND (SUSPECTED) EXPOSURE TO COVID-19: Primary | ICD-10-CM

## 2021-07-07 PROCEDURE — G0382 LEV 3 HOSP TYPE B ED VISIT: HCPCS | Performed by: PHYSICIAN ASSISTANT

## 2021-07-07 PROCEDURE — 87635 SARS-COV-2 COVID-19 AMP PRB: CPT | Performed by: PHYSICIAN ASSISTANT

## 2021-07-08 LAB — SARS-COV-2 RNA RESP QL NAA+PROBE: NEGATIVE

## 2021-07-08 NOTE — PATIENT INSTRUCTIONS
COVID swab collected today, test results pending  Continue to check for symptoms daily  Check MyChart and call in 2-3 days for test results  Results may take up to 7-10 days  Quarantine guidelines discussed  Follow-up with PCP in the next 1-2 days for re-evaluation  Go to the ED if any fevers, unable to stay hydrated, abdominal pain, chest pain, shortness of breath, wheezing, chest tightness, cough or cold symptoms, new or worsening symptoms or other concerning symptoms  [address]    Your healthcare provider and/or public health staff have evaluated you and have determined that you do not need to remain in the hospital at this time  At this time you can be isolated at home where you will be monitored by staff from your local or state health department  You should carefully follow the prevention and isolation steps below until a healthcare provider or local or state health department says that you can return to your normal activities  Stay home except to get medical care    People who are mildly ill with COVID-19 are able to isolate at home during their illness  You should restrict activities outside your home, except for getting medical care  Do not go to work, school, or public areas  Avoid using public transportation, ride-sharing, or taxis  Separate yourself from other people and animals in your home    People: As much as possible, you should stay in a specific room and away from other people in your home  Also, you should use a separate bathroom, if available  Animals: You should restrict contact with pets and other animals while you are sick with COVID-19, just like you would around other people  Although there have not been reports of pets or other animals becoming sick with COVID-19, it is still recommended that people sick with COVID-19 limit contact with animals until more information is known about the virus   When possible, have another member of your household care for your animals while you are sick  If you are sick with COVID-19, avoid contact with your pet, including petting, snuggling, being kissed or licked, and sharing food  If you must care for your pet or be around animals while you are sick, wash your hands before and after you interact with pets and wear a facemask  See COVID-19 and Animals for more information  Call ahead before visiting your doctor    If you have a medical appointment, call the healthcare provider and tell them that you have or may have COVID-19  This will help the healthcare providers office take steps to keep other people from getting infected or exposed  Wear a facemask    You should wear a facemask when you are around other people (e g , sharing a room or vehicle) or pets and before you enter a healthcare providers office  If you are not able to wear a facemask (for example, because it causes trouble breathing), then people who live with you should not stay in the same room with you, or they should wear a facemask if they enter your room  Cover your coughs and sneezes    Cover your mouth and nose with a tissue when you cough or sneeze  Throw used tissues in a lined trash can  Immediately wash your hands with soap and water for at least 20 seconds or, if soap and water are not available, clean your hands with an alcohol-based hand  that contains at least 60% alcohol  Clean your hands often    Wash your hands often with soap and water for at least 20 seconds, especially after blowing your nose, coughing, or sneezing; going to the bathroom; and before eating or preparing food  If soap and water are not readily available, use an alcohol-based hand  with at least 60% alcohol, covering all surfaces of your hands and rubbing them together until they feel dry  Soap and water are the best option if hands are visibly dirty  Avoid touching your eyes, nose, and mouth with unwashed hands      Avoid sharing personal household items    You should not share dishes, drinking glasses, cups, eating utensils, towels, or bedding with other people or pets in your home  After using these items, they should be washed thoroughly with soap and water  Clean all high-touch surfaces everyday    High touch surfaces include counters, tabletops, doorknobs, bathroom fixtures, toilets, phones, keyboards, tablets, and bedside tables  Also, clean any surfaces that may have blood, stool, or body fluids on them  Use a household cleaning spray or wipe, according to the label instructions  Labels contain instructions for safe and effective use of the cleaning product including precautions you should take when applying the product, such as wearing gloves and making sure you have good ventilation during use of the product  Monitor your symptoms    Seek prompt medical attention if your illness is worsening (e g , difficulty breathing)  Before seeking care, call your healthcare provider and tell them that you have, or are being evaluated for, COVID-19  Put on a facemask before you enter the facility  These steps will help the healthcare providers office to keep other people in the office or waiting room from getting infected or exposed  Ask your healthcare provider to call the local or [facility] health department  Persons who are placed under active monitoring or facilitated self-monitoring should follow instructions provided by their local health department or occupational health professionals, as appropriate  If you have a medical emergency and need to call 911, notify the dispatch personnel that you have, or are being evaluated for COVID-19  If possible, put on a facemask before emergency medical services arrive      Discontinuing home isolation    Patients with confirmed COVID-19 should remain under home isolation precautions until the following conditions are met:   - They have had no fever for at least 24 hours (that is one full day of no fever without the use medicine that reduces fevers)  AND  - other symptoms have improved (for example, when their cough or shortness of breath have improved)  AND  - If had mild or moderate illness, at least 10 days have passed since their symptoms first appeared or if severe illness (needed oxygen) or immunosuppressed, at least 20 days have passed since symptoms first appeared  Patients with confirmed COVID-19 should also notify close contacts (including their workplace) and ask that they self-quarantine  Currently, close contact is defined as being within 6 feet for 15 minutes or more from the period 24 hours starting 48 hours before symptom onset to the time at which the patient went into isolation  Close contacts of patients diagnosed with COVID-19 should be instructed by the patient to self-quarantine for 14 days from the last time of their last contact with the patient       Source: [employer]

## 2021-07-08 NOTE — PROGRESS NOTES
[address] [facility]        NAME: Susan Vallejo is a 27 y o  male  : 1990    MRN: [id number]  DATE: 2021  TIME: 10:33 PM    Assessment and Plan   Contact with and (suspected) exposure to covid-19 [Z20 822]  1  Contact with and (suspected) exposure to covid-19  Novel Coronavirus (Covid-19),PCR [facility] - Office Collection       Asymptomatic, direct exposure  He thinks exposure may have been on Monday but he is unsure- he will call to get more information on when the exposure date was  Explained it may be too early for testing at this time, patient is requesting testing at this time  He will continue to check for symptoms daily and verbalized understanding that he may need to be retested if he gets symptoms or if it has not been 5 days since the direct exposure  Patient Instructions     COVID swab collected today, test results pending  Continue to check for symptoms daily  Check MyChart and call in 2-3 days for test results  Results may take up to 7-10 days  Quarantine guidelines discussed  Follow-up with PCP in the next 1-2 days for re-evaluation  Go to the ED if any fevers, unable to stay hydrated, abdominal pain, chest pain, shortness of breath, wheezing, chest tightness, cough or cold symptoms, new or worsening symptoms or other concerning symptoms  Chief Complaint     Chief Complaint   Patient presents with    COVID-19     pt exposed to covid at work, work is requiring he be tested, has no symptoms         History of Present Illness        79-year-old male presents for COVID testing  States he got a phone call yesterday that he had a direct exposure and he needed to get tested  His work states he cannot return to work until he gets tested for [employer]  He thinks exposure may have been on Monday but he is unsure  He denies any fevers, cough or cold-like symptoms, loss of taste or smell or any complaints at this time    States he did previously have COVID last year, did not get the COVID vaccinations  Review of Systems   Review of Systems   Constitutional: Negative for chills, fatigue and fever  HENT: Negative for congestion, ear pain, rhinorrhea, sore throat, trouble swallowing and voice change  Eyes: Negative for itching and visual disturbance  Respiratory: Negative for cough, chest tightness, shortness of breath and wheezing  Cardiovascular: Negative for chest pain and leg swelling  Gastrointestinal: Negative for abdominal pain, diarrhea, nausea and vomiting  Musculoskeletal: Negative for back pain, joint swelling, neck pain and neck stiffness  Skin: Negative for rash  Neurological: Negative for dizziness, seizures, weakness, numbness and headaches  All other systems reviewed and are negative          Current Medications       Current Outpatient Medications:     acetaminophen (TYLENOL) 500 mg tablet, Take 2 tablets (1,000 mg total) by mouth every 6 (six) hours as needed for mild pain or fever (Patient not taking: Reported on 7/7/2021), Disp: 30 tablet, Rfl: 0    enoxaparin (LOVENOX) 60 mg/0 6 mL, , Disp: , Rfl:     ibuprofen (MOTRIN) 600 mg tablet, Take 1 tablet (600 mg total) by mouth every 6 (six) hours as needed for moderate pain or fever (Patient not taking: Reported on 7/7/2021), Disp: 30 tablet, Rfl: 0    ibuprofen (MOTRIN) 800 mg tablet, Take 1 tablet (800 mg total) by mouth 3 (three) times a day (Patient not taking: Reported on 7/7/2021), Disp: 21 tablet, Rfl: 0    methocarbamol (ROBAXIN) 500 mg tablet, Take 1 tablet (500 mg total) by mouth every 6 (six) hours as needed for muscle spasms (Patient not taking: Reported on 11/20/2018 ), Disp: 30 tablet, Rfl: 0    multivitamin (THERAGRAN) TABS, Take 1 tablet by mouth daily (Patient not taking: Reported on 7/7/2021), Disp: , Rfl:     nabumetone (RELAFEN) 750 mg tablet, Take 1 tablet (750 mg total) by mouth 2 (two) times a day (Patient not taking: Reported on 12/9/2019), Disp: 60 tablet, Rfl: 1    nicotine (NICODERM CQ) 14 mg/24hr TD 24 hr patch, Place 1 patch on the skin daily (Patient not taking: Reported on 10/16/2018 ), Disp: 28 patch, Rfl: 0    ondansetron (ZOFRAN-ODT) 4 mg disintegrating tablet, Take 1 tablet (4 mg total) by mouth every 6 (six) hours as needed for nausea or vomiting (Patient not taking: Reported on 7/7/2021), Disp: 12 tablet, Rfl: 0    senna (SENOKOT) 8 6 mg, Take 2 tablets (17 2 mg total) by mouth daily at bedtime (Patient not taking: Reported on 10/16/2018 ), Disp: 120 each, Rfl: 0    Current Allergies     Allergies as of 07/07/2021    (No Known Allergies)            The following portions of the patient's history were reviewed and updated as appropriate: allergies, current medications, past family history, past medical history, past social history, past surgical history and problem list      History reviewed  No pertinent past medical history  Past Surgical History:   Procedure Laterality Date    CIRCUMCISION      Elective    ORIF PELVIS Bilateral 9/27/2018    Procedure: OPEN REDUCTION W/ INTERNAL FIXATION (ORIF) PELVIS ANTERIOR/POSTERIOR APPROACH, ORIF PUBIC SYMPHYSIS;  Surgeon: Litzy Ba MD;  [facility]: [facility];  Service: Orthopedics       Family History   Problem Relation Age of Onset    Diabetes Mother          Medications have been verified  Objective   Pulse 100   Temp 98 °F (36 7 °C)   Resp 20   SpO2 99%        Physical Exam     Physical Exam  Vitals and nursing note reviewed  Constitutional:       General: He is not in acute distress  Appearance: Normal appearance  He is well-developed  He is not toxic-appearing  HENT:      Mouth/Throat:      Mouth: Mucous membranes are moist       Pharynx: Oropharynx is clear  Uvula midline  No oropharyngeal exudate, posterior oropharyngeal erythema or uvula swelling  Eyes:      Extraocular Movements: Extraocular movements intact  Pupils: Pupils are equal, round, and reactive to light     Cardiovascular: Rate and Rhythm: Normal rate and regular rhythm  Heart sounds: Normal heart sounds  Pulmonary:      Effort: Pulmonary effort is normal  No respiratory distress  Breath sounds: Normal breath sounds  No wheezing  Musculoskeletal:      Cervical back: Normal range of motion and neck supple  No rigidity  Skin:     Capillary Refill: Capillary refill takes less than 2 seconds  Neurological:      Mental Status: He is alert and oriented to person, place, and time     Psychiatric:         Behavior: Behavior normal

## 2023-05-02 ENCOUNTER — HOSPITAL ENCOUNTER (EMERGENCY)
Facility: HOSPITAL | Age: 33
Discharge: HOME | End: 2023-05-03
Attending: EMERGENCY MEDICINE

## 2023-05-02 VITALS
TEMPERATURE: 98.2 F | RESPIRATION RATE: 18 BRPM | SYSTOLIC BLOOD PRESSURE: 189 MMHG | DIASTOLIC BLOOD PRESSURE: 100 MMHG | HEART RATE: 72 BPM | OXYGEN SATURATION: 99 %

## 2023-05-02 DIAGNOSIS — M54.50 CHRONIC RIGHT-SIDED LOW BACK PAIN WITHOUT SCIATICA: Primary | ICD-10-CM

## 2023-05-02 DIAGNOSIS — R03.0 ELEVATED BLOOD PRESSURE READING: ICD-10-CM

## 2023-05-02 DIAGNOSIS — G89.29 CHRONIC RIGHT-SIDED LOW BACK PAIN WITHOUT SCIATICA: Primary | ICD-10-CM

## 2023-05-02 RX ORDER — ACETAMINOPHEN 325 MG/1
650 TABLET ORAL ONCE
Status: COMPLETED | OUTPATIENT
Start: 2023-05-03 | End: 2023-05-02

## 2023-05-02 RX ORDER — IBUPROFEN 400 MG/1
400 TABLET ORAL ONCE
Status: COMPLETED | OUTPATIENT
Start: 2023-05-03 | End: 2023-05-02

## 2023-05-02 RX ORDER — LIDOCAINE 50 MG/G
1 PATCH TOPICAL ONCE
Status: DISCONTINUED | OUTPATIENT
Start: 2023-05-03 | End: 2023-05-03 | Stop reason: HOSPADM

## 2023-05-02 RX ADMIN — IBUPROFEN 400 MG: 400 TABLET ORAL at 23:58

## 2023-05-02 RX ADMIN — LIDOCAINE 1 PATCH: 50 PATCH CUTANEOUS at 23:58

## 2023-05-02 RX ADMIN — ACETAMINOPHEN 650 MG: 325 TABLET ORAL at 23:58

## 2023-05-02 NOTE — Clinical Note
Carson Chu was seen and treated in our emergency department on 5/2/2023  Diagnosis:     Burak    He may return on this date: 05/04/2023    Patient was evaluated and treated in our emergency department for an acute condition  Please excuse his absence from work for the dates of 5/1/23 - 5/4/23  If you have any questions or concerns, please don't hesitate to call        Janes Gray MD    ______________________________           _______________          _______________  Hospital Representative                              Date                                Time

## 2023-05-03 ENCOUNTER — TELEPHONE (OUTPATIENT)
Dept: PHYSICAL THERAPY | Facility: OTHER | Age: 33
End: 2023-05-03

## 2023-05-03 NOTE — TELEPHONE ENCOUNTER
"Call placed to the patient per [facility] referral     Jasper Santos to connect, each time I tried to call I received a \"fast busy\" signal  Used number in chart, [phone]  Tried 3 times  With same result    Will defer for 3 days and try again    This is the 1st attempt to reach the patient  Will defer per protocol          "
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
## 2023-05-03 NOTE — DISCHARGE INSTRUCTIONS
You will receive a call this week from the comprehensive spine program to set up an appointment to be evaluated by a back specialist who will prescribe physical therapy and any other treatment required by your back pain  Set up appointment with your doctor discuss your high blood pressure today  Return to ER for any severe pack pain that interferes with daily activities, back pain with fever, leg weakness, or urinary or fecal incontinence  Take Tylenol 500 mg and ibuprofen 400 mg every 6 hours for pain

## 2023-05-09 NOTE — TELEPHONE ENCOUNTER
"Call placed to the patient per [facility] referral      Unable to leave a message  \"busy tone\"      This is the 2nd attempt to reach the patient    Will defer per protocol         "

## 2024-02-21 PROBLEM — S36.039A SPLENIC LACERATION: Status: RESOLVED | Noted: 2018-09-25 | Resolved: 2024-02-21

## 2024-04-18 ENCOUNTER — APPOINTMENT (OUTPATIENT)
Dept: URGENT CARE | Age: 34
End: 2024-04-18

## 2024-05-10 ENCOUNTER — HOSPITAL ENCOUNTER (EMERGENCY)
Facility: HOSPITAL | Age: 34
Discharge: HOME | End: 2024-05-10
Attending: EMERGENCY MEDICINE

## 2024-05-10 VITALS
RESPIRATION RATE: 18 BRPM | OXYGEN SATURATION: 98 % | HEART RATE: 96 BPM | SYSTOLIC BLOOD PRESSURE: 172 MMHG | DIASTOLIC BLOOD PRESSURE: 117 MMHG | TEMPERATURE: 99.4 F

## 2024-05-10 DIAGNOSIS — S16.1XXA ACUTE STRAIN OF NECK MUSCLE, INITIAL ENCOUNTER: Primary | ICD-10-CM

## 2024-05-10 PROCEDURE — 99283 EMERGENCY DEPT VISIT LOW MDM: CPT

## 2024-05-10 PROCEDURE — 99284 EMERGENCY DEPT VISIT MOD MDM: CPT | Performed by: EMERGENCY MEDICINE

## 2024-05-10 RX ORDER — METHOCARBAMOL 500 MG/1
500 TABLET, FILM COATED ORAL 2 TIMES DAILY
Qty: 14 TABLET | Refills: 0 | Status: SHIPPED | OUTPATIENT
Start: 2024-05-10 | End: 2024-05-17

## 2024-05-10 RX ORDER — METHOCARBAMOL 500 MG/1
500 TABLET, FILM COATED ORAL ONCE
Status: COMPLETED | OUTPATIENT
Start: 2024-05-10 | End: 2024-05-10

## 2024-05-10 RX ORDER — IBUPROFEN 400 MG/1
400 TABLET ORAL ONCE
Status: COMPLETED | OUTPATIENT
Start: 2024-05-10 | End: 2024-05-10

## 2024-05-10 RX ORDER — LIDOCAINE 50 MG/G
1 PATCH TOPICAL ONCE
Status: DISCONTINUED | OUTPATIENT
Start: 2024-05-10 | End: 2024-05-11 | Stop reason: HOSPADM

## 2024-05-10 RX ORDER — LIDOCAINE 50 MG/G
1 PATCH TOPICAL DAILY
Qty: 7 PATCH | Refills: 0 | Status: SHIPPED | OUTPATIENT
Start: 2024-05-10

## 2024-05-10 RX ORDER — NAPROXEN 500 MG/1
500 TABLET ORAL 2 TIMES DAILY WITH MEALS
Qty: 14 TABLET | Refills: 0 | Status: SHIPPED | OUTPATIENT
Start: 2024-05-10 | End: 2024-05-17

## 2024-05-10 RX ADMIN — IBUPROFEN 400 MG: 400 TABLET, FILM COATED ORAL at 22:15

## 2024-05-10 RX ADMIN — LIDOCAINE 5% 1 PATCH: 700 PATCH TOPICAL at 22:15

## 2024-05-10 RX ADMIN — METHOCARBAMOL 500 MG: 500 TABLET ORAL at 22:15

## 2024-05-10 NOTE — Clinical Note
Burak Abraham was seen and treated in our emergency department on 5/10/2024.                Diagnosis:     Burak  may return to work on return date.    He may return on this date: 05/13/2024    Burak Abraham was seen in our ED. He may return to work on 5/13/2024.     If you have any questions or concerns, please don't hesitate to call.      Tj Hu, DO    ______________________________           _______________          _______________  Hospital Representative                              Date                                Time

## 2024-05-11 NOTE — DISCHARGE INSTRUCTIONS
Your evaluation suggests that your symptoms are due to a non emergent cause most consistent with a muscular strain.    Please follow up with your primary care physician within one week.    Take the medications as prescribed.    Return to the Emergency Department if you experience worsening or concerning symptoms.    Thank you for choosing us for your care.
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
## 2025-03-27 ENCOUNTER — HOSPITAL ENCOUNTER (EMERGENCY)
Facility: HOSPITAL | Age: 35
Discharge: HOME | End: 2025-03-27
Attending: EMERGENCY MEDICINE
Payer: COMMERCIAL

## 2025-03-27 VITALS
OXYGEN SATURATION: 97 % | TEMPERATURE: 97.6 F | WEIGHT: 230 LBS | RESPIRATION RATE: 18 BRPM | DIASTOLIC BLOOD PRESSURE: 96 MMHG | HEART RATE: 68 BPM | BODY MASS INDEX: 34.97 KG/M2 | SYSTOLIC BLOOD PRESSURE: 151 MMHG

## 2025-03-27 DIAGNOSIS — J06.9 URI (UPPER RESPIRATORY INFECTION): Primary | ICD-10-CM

## 2025-03-27 DIAGNOSIS — R11.2 NAUSEA & VOMITING: ICD-10-CM

## 2025-03-27 LAB
FLUAV AG UPPER RESP QL IA.RAPID: NEGATIVE
FLUBV AG UPPER RESP QL IA.RAPID: NEGATIVE
SARS-COV+SARS-COV-2 AG RESP QL IA.RAPID: NEGATIVE

## 2025-03-27 PROCEDURE — 87811 SARS-COV-2 COVID19 W/OPTIC: CPT | Performed by: PHYSICIAN ASSISTANT

## 2025-03-27 PROCEDURE — 87804 INFLUENZA ASSAY W/OPTIC: CPT | Performed by: PHYSICIAN ASSISTANT

## 2025-03-27 PROCEDURE — 99283 EMERGENCY DEPT VISIT LOW MDM: CPT

## 2025-03-27 PROCEDURE — 99284 EMERGENCY DEPT VISIT MOD MDM: CPT | Performed by: PHYSICIAN ASSISTANT

## 2025-03-27 RX ORDER — ONDANSETRON 4 MG/1
4 TABLET, ORALLY DISINTEGRATING ORAL ONCE
Status: COMPLETED | OUTPATIENT
Start: 2025-03-27 | End: 2025-03-27

## 2025-03-27 RX ORDER — ONDANSETRON 4 MG/1
4 TABLET, FILM COATED ORAL EVERY 8 HOURS PRN
Qty: 3 TABLET | Refills: 0 | Status: SHIPPED | OUTPATIENT
Start: 2025-03-27 | End: 2025-03-30

## 2025-03-27 RX ADMIN — ONDANSETRON 4 MG: 4 TABLET, ORALLY DISINTEGRATING ORAL at 17:23

## 2025-03-27 NOTE — ED PROVIDER NOTES
Time reflects when diagnosis was documented in both MDM as applicable and the Disposition within this note       Time User Action Codes Description Comment    3/27/2025  5:27 PM Arin Elizalde Add [J06.9] URI (upper respiratory infection)     3/27/2025  5:27 PM Arin Elizalde Add [R11.2] Nausea & vomiting           ED Disposition       ED Disposition   Discharge    Condition   Stable    Date/Time   Thu Mar 27, 2025  5:27 PM    Comment   Burak Abraham discharge to home/self care.                   Assessment & Plan       Medical Decision Making  Differential diagnosis includes but is not limited to: Flu, viral illness, gastritis, URI    Patient continues to appear well at discharge, discussed brat diet with him    Amount and/or Complexity of Data Reviewed  Labs: ordered.    Risk  Prescription drug management.             Medications   ondansetron (ZOFRAN-ODT) dispersible tablet 4 mg (4 mg Oral Given 3/27/25 1723)       ED Risk Strat Scores                                                History of Present Illness       Chief Complaint   Patient presents with    Cough     Cough, sore throat, nausea, congestion, no fever since Monday  no other reported ill contacts       History reviewed. No pertinent past medical history.   Past Surgical History:   Procedure Laterality Date    CIRCUMCISION      Elective    ORIF PELVIS Bilateral 9/27/2018    Procedure: OPEN REDUCTION W/ INTERNAL FIXATION (ORIF) PELVIS ANTERIOR/POSTERIOR APPROACH, ORIF PUBIC SYMPHYSIS;  Surgeon: Shilo Bowers MD;  Location: [facility];  Service: Orthopedics      Family History   Problem Relation Age of Onset    Diabetes Mother       Social History     Tobacco Use    Smoking status: Every Day     Current packs/day: 0.25     Average packs/day: 0.3 packs/day for 15.0 years (3.8 ttl pk-yrs)     Types: Cigarettes    Smokeless tobacco: Never    Tobacco comments:     pack per week    Vaping Use    Vaping status: Never Used   Substance Use Topics    Alcohol use:  Yes    Drug use: Yes     Frequency: 1.0 times per week     Types: Marijuana      E-Cigarette/Vaping    E-Cigarette Use Never User       E-Cigarette/Vaping Substances    Nicotine No     THC No     CBD No     Flavoring No     Other No     Unknown No       I have reviewed and agree with the history as documented.     34yo male who presents to ER for evaluation of nausea and body aches. Onset 3 days ago and has not improved.  At 3 AM this morning he woke up and threw up.  States he called off work again for the third time this week due to this illness.  States cough and nasal congestion are not very significant.  It is mostly the nausea and bodyaches that is bothering him.  Denies fever.  Denies known sick contacts.  Has abdominal pain.  Denies problems urinating.  Denies diarrhea.  Denies shortness of breath or chest pain.      History provided by:  Patient  Cough  Associated symptoms: no chest pain, no chills, no fever, no rash and no shortness of breath        Review of Systems   Constitutional:  Negative for chills and fever.   HENT:  Positive for congestion.    Respiratory:  Positive for cough. Negative for shortness of breath.    Cardiovascular:  Negative for chest pain.   Gastrointestinal:  Positive for abdominal pain, nausea and vomiting. Negative for constipation and diarrhea.   Skin:  Negative for rash.   Neurological:  Negative for dizziness.           Objective       ED Triage Vitals [03/27/25 1638]   Temperature Pulse Blood Pressure Respirations SpO2 Patient Position - Orthostatic VS   97.6 °F (36.4 °C) 68 151/96 18 97 % Sitting      Temp Source Heart Rate Source BP Location FiO2 (%) Pain Score    Oral Monitor Left arm -- --      Vitals      Date and Time Temp Pulse SpO2 Resp BP Pain Score FACES Pain Rating User   03/27/25 1638 97.6 °F (36.4 °C) 68 97 % 18 151/96 -- -- CEK            Physical Exam  Vitals and nursing note reviewed.   Constitutional:       Appearance: Normal appearance. He is well-developed.    HENT:      Head: Atraumatic.      Right Ear: Tympanic membrane and external ear normal.      Left Ear: Tympanic membrane and external ear normal.      Nose: Nose normal. No rhinorrhea.      Mouth/Throat:      Mouth: Mucous membranes are moist.      Pharynx: Uvula midline. No oropharyngeal exudate or posterior oropharyngeal erythema.      Tonsils: No tonsillar exudate.   Eyes:      General: No scleral icterus.     Conjunctiva/sclera: Conjunctivae normal.   Cardiovascular:      Rate and Rhythm: Normal rate and regular rhythm.      Heart sounds: Normal heart sounds.   Pulmonary:      Effort: Pulmonary effort is normal.      Breath sounds: Normal breath sounds.   Chest:      Chest wall: No tenderness.   Abdominal:      General: There is no distension.      Tenderness: There is no abdominal tenderness. There is no right CVA tenderness or left CVA tenderness.   Musculoskeletal:      Cervical back: Neck supple.   Lymphadenopathy:      Cervical: No cervical adenopathy.   Skin:     General: Skin is warm and dry.      Findings: No rash.   Neurological:      Mental Status: He is alert and oriented to person, place, and time.   Psychiatric:         Mood and Affect: Mood normal.         Results Reviewed       Procedure Component Value Units Date/Time    FLU/COVID Rapid Antigen (30 min. TAT) - Preferred screening test in ED [[id number]] Collected: 03/27/25 1723    Lab Status: In process Specimen: Nares from Nose Updated: 03/27/25 1725            No orders to display       Procedures    ED Medication and Procedure Management   Prior to Admission Medications   Prescriptions Last Dose Informant Patient Reported? Taking?   acetaminophen (TYLENOL) 500 mg tablet   No No   Sig: Take 2 tablets (1,000 mg total) by mouth every 6 (six) hours as needed for mild pain or fever   Patient not taking: Reported on 7/7/2021   enoxaparin (LOVENOX) 60 mg/0.6 mL   Yes No   Patient not taking: Reported on 7/7/2021   ibuprofen (MOTRIN) 600 mg tablet    No No   Sig: Take 1 tablet (600 mg total) by mouth every 6 (six) hours as needed for moderate pain or fever   Patient not taking: Reported on 7/7/2021   ibuprofen (MOTRIN) 800 mg tablet   No No   Sig: Take 1 tablet (800 mg total) by mouth 3 (three) times a day   Patient not taking: Reported on 7/7/2021   lidocaine (Lidoderm) 5 %   No No   Sig: Apply 1 patch topically over 12 hours daily Remove & Discard patch within 12 hours or as directed by MD   methocarbamol (ROBAXIN) 500 mg tablet   No No   Sig: Take 1 tablet (500 mg total) by mouth every 6 (six) hours as needed for muscle spasms   Patient not taking: Reported on 11/20/2018    methocarbamol (ROBAXIN) 500 mg tablet   No No   Sig: Take 1 tablet (500 mg total) by mouth 2 (two) times a day for 7 days   multivitamin (THERAGRAN) TABS   Yes No   Sig: Take 1 tablet by mouth daily   Patient not taking: Reported on 7/7/2021   nabumetone (RELAFEN) 750 mg tablet   No No   Sig: Take 1 tablet (750 mg total) by mouth 2 (two) times a day   Patient not taking: Reported on 12/9/2019   naproxen (Naprosyn) 500 mg tablet   No No   Sig: Take 1 tablet (500 mg total) by mouth 2 (two) times a day with meals for 7 days   nicotine (NICODERM CQ) 14 mg/24hr TD 24 hr patch   No No   Sig: Place 1 patch on the skin daily   Patient not taking: Reported on 10/16/2018    ondansetron (ZOFRAN-ODT) 4 mg disintegrating tablet   No No   Sig: Take 1 tablet (4 mg total) by mouth every 6 (six) hours as needed for nausea or vomiting   Patient not taking: Reported on 7/7/2021   senna (SENOKOT) 8.6 mg   No No   Sig: Take 2 tablets (17.2 mg total) by mouth daily at bedtime   Patient not taking: Reported on 10/16/2018       Facility-Administered Medications: None     Patient's Medications   Discharge Prescriptions    ONDANSETRON (ZOFRAN) 4 MG TABLET    Take 1 tablet (4 mg total) by mouth every 8 (eight) hours as needed for nausea or vomiting for up to 3 days       Start Date: 3/27/2025 End Date: 3/30/2025        Order Dose: 4 mg       Quantity: 3 tablet    Refills: 0     No discharge procedures on file.  ED SEPSIS DOCUMENTATION   Time reflects when diagnosis was documented in both MDM as applicable and the Disposition within this note       Time User Action Codes Description Comment    3/27/2025  5:27 PM Arin Elizalde [J06.9] URI (upper respiratory infection)     3/27/2025  5:27 PM Arin Elizalde [R11.2] Nausea & vomiting                  Arin Elizalde PA-C  03/27/25 8207

## 2025-03-27 NOTE — Clinical Note
Burak Abraham was seen and treated in our emergency department on 3/27/2025.    No restrictions            Diagnosis:     Burak  may return to work on return date.    He may return on this date: 03/28/2025         If you have any questions or concerns, please don't hesitate to call.      Arin Elizalde PA-C    ______________________________           _______________          _______________  Hospital Representative                              Date                                Time

## (undated) DEVICE — DRESSING MEPILEX AG BORDER 4 X 4 IN

## (undated) DEVICE — DRAPE C-ARM X-RAY

## (undated) DEVICE — TRAY FOLEY 16FR URIMETER SURESTEP

## (undated) DEVICE — CHLORAPREP HI-LITE 26ML ORANGE

## (undated) DEVICE — PLUMEPEN PRO 10FT

## (undated) DEVICE — DRAPE UTILITY

## (undated) DEVICE — TUBING SUCTION 5MM X 12 FT

## (undated) DEVICE — MEDI-VAC YANKAUER SUCTION HANDLE W/STRAIGHT TIP & CONTROL VENT: Brand: CARDINAL HEALTH

## (undated) DEVICE — 2.8MM THREADED GUIDE WIRE- TROCAR POINT 300MM

## (undated) DEVICE — ASTOUND STANDARD SURGICAL GOWN, XL: Brand: CONVERTORS

## (undated) DEVICE — GLOVE INDICATOR PI UNDERGLOVE SZ 8.5 BLUE

## (undated) DEVICE — SUT VICRYL PLUS 2-0 CTB-1 27 IN VCPB259H

## (undated) DEVICE — SILVER-COATED ANTIMICROBIAL BARRIER DRESSING: Brand: ACTICOAT   4" X 8"

## (undated) DEVICE — DRESSING MEPILEX AG BORDER 4 X 10 IN

## (undated) DEVICE — GLOVE SRG BIOGEL 8

## (undated) DEVICE — 4.5MM CORTEX SCREW SELF-TAPPING 50MM
Type: IMPLANTABLE DEVICE | Site: PELVIS | Status: NON-FUNCTIONAL
Removed: 2018-09-27

## (undated) DEVICE — INTENDED FOR TISSUE SEPARATION, AND OTHER PROCEDURES THAT REQUIRE A SHARP SURGICAL BLADE TO PUNCTURE OR CUT.: Brand: BARD-PARKER SAFETY BLADES SIZE 10, STERILE

## (undated) DEVICE — DRAPE TOWEL: Brand: CONVERTORS

## (undated) DEVICE — TIBURON TRANSVERSE LAPAROTOMY SHEET: Brand: CONVERTORS

## (undated) DEVICE — BETHLEHEM TOTAL HIP, KIT: Brand: CARDINAL HEALTH

## (undated) DEVICE — THE SIMPULSE SOLO SYSTEM WITH ULTREX RETRACTABLE SPLASH SHIELD TIP: Brand: SIMPULSE SOLO

## (undated) DEVICE — SUT VICRYL PLUS 1 CTB-1 36 IN VCPB947H

## (undated) DEVICE — BUTTON SWITCH PENCIL HOLSTER: Brand: VALLEYLAB

## (undated) DEVICE — REM POLYHESIVE ADULT PATIENT RETURN ELECTRODE: Brand: VALLEYLAB

## (undated) DEVICE — TOWEL SET X-RAY

## (undated) DEVICE — 2.5MM THREE-FLUTED DRILL BIT QC/230MM/200MM CALIBRATION

## (undated) DEVICE — 3M™ IOBAN™ 2 ANTIMICROBIAL INCISE DRAPE 6650EZ: Brand: IOBAN™ 2